# Patient Record
Sex: FEMALE | Race: WHITE | Employment: OTHER | URBAN - METROPOLITAN AREA
[De-identification: names, ages, dates, MRNs, and addresses within clinical notes are randomized per-mention and may not be internally consistent; named-entity substitution may affect disease eponyms.]

---

## 2017-05-01 ENCOUNTER — TELEPHONE (OUTPATIENT)
Dept: OBGYN | Facility: CLINIC | Age: 32
End: 2017-05-01

## 2017-05-01 NOTE — TELEPHONE ENCOUNTER
Pt is past due for f/u pap smear.  Reminder letter was sent 10/24/16.  LMTC and schedule at [facility].  Left this writer's number in case of questions ([phone]).  If no reply and/or appt within 2 weeks (05/15/17) pt will be considered lost to pap tracking f/u.  Chelsea Dinh,    Pap Tracking

## 2017-05-01 NOTE — TELEPHONE ENCOUNTER
Pt promptly returned my call, LM on my VM giving permission to leave a detailed message when calling her back.  Left detailed message for pt with reason for my call.  Again left the [facility] number as well as my own in case of more questions.  Chelsea Dinh,    Pap Tracking

## 2017-07-15 ENCOUNTER — HEALTH MAINTENANCE LETTER (OUTPATIENT)
Age: 32
End: 2017-07-15

## 2018-02-11 ENCOUNTER — NURSE TRIAGE (OUTPATIENT)
Dept: NURSING | Facility: CLINIC | Age: 33
End: 2018-02-11

## 2018-02-11 DIAGNOSIS — Z23 NEED FOR PROPHYLACTIC VACCINATION AND INOCULATION AGAINST INFLUENZA: Primary | ICD-10-CM

## 2018-02-11 RX ORDER — OSELTAMIVIR PHOSPHATE 75 MG/1
75 CAPSULE ORAL DAILY
Qty: 10 CAPSULE | Refills: 0 | Status: SHIPPED | OUTPATIENT
Start: 2018-02-11 | End: 2018-02-21

## 2018-02-11 NOTE — TELEPHONE ENCOUNTER
Patient's 8 year old daughter has been diagnosed with influenza on Friday 2/9. Now the 3 other children in the family have influenza symptoms.   Patient does not have any symptoms currently.     Patient goes to the [facility] for her care. Kaylene Alaniz is on call for this clinic and she was paged via [employer] at 2:44 to discuss Tamiflu for her children.  The on call provider is going to order Tamiflu prophylactic for the Patient also.     Patient advised to call back if further questions or concerns.    Reason for Disposition    [1] Influenza EXPOSURE within last 72 hours (3 days) AND [2] NOT HIGH RISK AND [3] strongly requests antiviral medication    Additional Information    Negative: Patient sounds very sick or weak to the triager    Negative: Fever present > 3 days (72 hours)    Protocols used: INFLUENZA EXPOSURE-ADULT-

## 2018-02-11 NOTE — TELEPHONE ENCOUNTER
Mother called in today and stated that child developed illness yesterday and was brought in where they tested positive for influenza.  Mother does not yet have symptoms.  Mother requested tamiflu.  Prescription will be given, script sent to [facility] pharmacy.

## 2018-07-22 ENCOUNTER — HEALTH MAINTENANCE LETTER (OUTPATIENT)
Age: 33
End: 2018-07-22

## 2019-01-28 ENCOUNTER — OFFICE VISIT (OUTPATIENT)
Dept: FAMILY MEDICINE | Facility: CLINIC | Age: 34
End: 2019-01-28
Payer: COMMERCIAL

## 2019-01-28 ENCOUNTER — ANCILLARY PROCEDURE (OUTPATIENT)
Dept: GENERAL RADIOLOGY | Facility: CLINIC | Age: 34
End: 2019-01-28
Payer: COMMERCIAL

## 2019-01-28 VITALS
RESPIRATION RATE: 16 BRPM | BODY MASS INDEX: 33.9 KG/M2 | TEMPERATURE: 98.1 F | SYSTOLIC BLOOD PRESSURE: 149 MMHG | WEIGHT: 216 LBS | OXYGEN SATURATION: 99 % | DIASTOLIC BLOOD PRESSURE: 80 MMHG | HEART RATE: 72 BPM | HEIGHT: 67 IN

## 2019-01-28 DIAGNOSIS — R10.11 RIGHT UPPER QUADRANT PAIN: Primary | ICD-10-CM

## 2019-01-28 DIAGNOSIS — R10.11 RIGHT UPPER QUADRANT PAIN: ICD-10-CM

## 2019-01-28 LAB
ALBUMIN SERPL-MCNC: 4 G/DL (ref 3.4–5)
ALP SERPL-CCNC: 65 U/L (ref 40–150)
ALT SERPL W P-5'-P-CCNC: 46 U/L (ref 0–50)
ANION GAP SERPL CALCULATED.3IONS-SCNC: 4 MMOL/L (ref 3–14)
AST SERPL W P-5'-P-CCNC: 23 U/L (ref 0–45)
BASOPHILS # BLD AUTO: 0.1 10E9/L (ref 0–0.2)
BASOPHILS NFR BLD AUTO: 0.8 %
BILIRUB SERPL-MCNC: 0.3 MG/DL (ref 0.2–1.3)
BUN SERPL-MCNC: 13 MG/DL (ref 7–30)
CALCIUM SERPL-MCNC: 9.1 MG/DL (ref 8.5–10.1)
CHLORIDE SERPL-SCNC: 102 MMOL/L (ref 94–109)
CO2 SERPL-SCNC: 31 MMOL/L (ref 20–32)
CREAT SERPL-MCNC: 0.8 MG/DL (ref 0.52–1.04)
DIFFERENTIAL METHOD BLD: NORMAL
EOSINOPHIL # BLD AUTO: 0.6 10E9/L (ref 0–0.7)
EOSINOPHIL NFR BLD AUTO: 6.7 %
ERYTHROCYTE [DISTWIDTH] IN BLOOD BY AUTOMATED COUNT: 12 % (ref 10–15)
GFR SERPL CREATININE-BSD FRML MDRD: >90 ML/MIN/{1.73_M2}
GLUCOSE SERPL-MCNC: 89 MG/DL (ref 70–99)
HCT VFR BLD AUTO: 40.1 % (ref 35–47)
HGB BLD-MCNC: 13.7 G/DL (ref 11.7–15.7)
LIPASE SERPL-CCNC: 186 U/L (ref 73–393)
LYMPHOCYTES # BLD AUTO: 2.9 10E9/L (ref 0.8–5.3)
LYMPHOCYTES NFR BLD AUTO: 32.1 %
MCH RBC QN AUTO: 30.1 PG (ref 26.5–33)
MCHC RBC AUTO-ENTMCNC: 34.2 G/DL (ref 31.5–36.5)
MCV RBC AUTO: 88 FL (ref 78–100)
MONOCYTES # BLD AUTO: 0.9 10E9/L (ref 0–1.3)
MONOCYTES NFR BLD AUTO: 10 %
NEUTROPHILS # BLD AUTO: 4.6 10E9/L (ref 1.6–8.3)
NEUTROPHILS NFR BLD AUTO: 50.4 %
PLATELET # BLD AUTO: 226 10E9/L (ref 150–450)
POTASSIUM SERPL-SCNC: 3.7 MMOL/L (ref 3.4–5.3)
PROT SERPL-MCNC: 7.1 G/DL (ref 6.8–8.8)
RBC # BLD AUTO: 4.55 10E12/L (ref 3.8–5.2)
SODIUM SERPL-SCNC: 137 MMOL/L (ref 133–144)
WBC # BLD AUTO: 9.1 10E9/L (ref 4–11)

## 2019-01-28 PROCEDURE — 80053 COMPREHEN METABOLIC PANEL: CPT | Performed by: NURSE PRACTITIONER

## 2019-01-28 PROCEDURE — 83690 ASSAY OF LIPASE: CPT | Performed by: NURSE PRACTITIONER

## 2019-01-28 PROCEDURE — 36415 COLL VENOUS BLD VENIPUNCTURE: CPT | Performed by: NURSE PRACTITIONER

## 2019-01-28 PROCEDURE — 99213 OFFICE O/P EST LOW 20 MIN: CPT | Performed by: NURSE PRACTITIONER

## 2019-01-28 PROCEDURE — 74019 RADEX ABDOMEN 2 VIEWS: CPT

## 2019-01-28 PROCEDURE — 85025 COMPLETE CBC W/AUTO DIFF WBC: CPT | Performed by: NURSE PRACTITIONER

## 2019-01-28 ASSESSMENT — MIFFLIN-ST. JEOR: SCORE: 1707.4

## 2019-01-28 NOTE — PATIENT INSTRUCTIONS
1.  Drink 64 oz of non-caffinated beverages daily.  2.  Google FODMAP diet and try changing diet.  3.  Try Benefiber 2-3 times daily.  4.  Use miralax once or twice daily if symptoms recur as needed.  5.  TODAY:  Get an 8.3 oz bottle of Miralax and mix the entire bottle with Gatorade 64 oz.  Drink one glassful (8 oz) every 15 minutes until gone.  6.  I will call you with lab results tomorrow.  CBC was normal, no infection.

## 2019-01-28 NOTE — LETTER
January 28, 2019      Capri Albert  [id number] [address] [facility] [facility] [address]        Dear ,    We are writing to inform you of your test results.  Please let patient know labs were all normal.      Resulted Orders   CBC with platelets and differential   Result Value Ref Range    WBC 9.1 4.0 - 11.0 10e9/L    RBC Count 4.55 3.8 - 5.2 10e12/L    Hemoglobin 13.7 11.7 - 15.7 g/dL    Hematocrit 40.1 35.0 - 47.0 %    MCV 88 78 - 100 fl    MCH 30.1 26.5 - 33.0 pg    MCHC 34.2 31.5 - 36.5 g/dL    RDW 12.0 10.0 - 15.0 %    Platelet Count 226 150 - 450 10e9/L    % Neutrophils 50.4 %    % Lymphocytes 32.1 %    % Monocytes 10.0 %    % Eosinophils 6.7 %    % Basophils 0.8 %    Absolute Neutrophil 4.6 1.6 - 8.3 10e9/L    Absolute Lymphocytes 2.9 0.8 - 5.3 10e9/L    Absolute Monocytes 0.9 0.0 - 1.3 10e9/L    Absolute Eosinophils 0.6 0.0 - 0.7 10e9/L    Absolute Basophils 0.1 0.0 - 0.2 10e9/L    Diff Method Automated Method    Comprehensive metabolic panel (BMP + Alb, Alk Phos, ALT, AST, Total. Bili, TP)   Result Value Ref Range    Sodium 137 133 - 144 mmol/L    Potassium 3.7 3.4 - 5.3 mmol/L    Chloride 102 94 - 109 mmol/L    Carbon Dioxide 31 20 - 32 mmol/L    Anion Gap 4 3 - 14 mmol/L    Glucose 89 70 - 99 mg/dL      Comment:      Non Fasting    Urea Nitrogen 13 7 - 30 mg/dL    Creatinine 0.80 0.52 - 1.04 mg/dL    GFR Estimate >90 >60 mL/min/[1.73_m2]      Comment:      Non  GFR Calc  Starting 12/18/2018, serum creatinine based estimated GFR (eGFR) will be   calculated using the Chronic Kidney Disease Epidemiology Collaboration   (CKD-EPI) equation.      GFR Estimate If Black >90 >60 mL/min/[1.73_m2]      Comment:       GFR Calc  Starting 12/18/2018, serum creatinine based estimated GFR (eGFR) will be   calculated using the Chronic Kidney Disease Epidemiology Collaboration   (CKD-EPI) equation.      Calcium 9.1 8.5 - 10.1 mg/dL    Bilirubin Total 0.3 0.2 - 1.3 mg/dL    Albumin 4.0 3.4 - 5.0  g/dL    Protein Total 7.1 6.8 - 8.8 g/dL    Alkaline Phosphatase 65 40 - 150 U/L    ALT 46 0 - 50 U/L    AST 23 0 - 45 U/L   Lipase   Result Value Ref Range    Lipase 186 73 - 393 U/L       If you have any questions or concerns, please call the clinic at the number listed above.       Sincerely,        Eileen Coto, NP/cb

## 2019-01-28 NOTE — PROGRESS NOTES
SUBJECTIVE:   Capri Albert is a 33 year old female who presents to clinic today for the following health issues:      ABDOMINAL   PAIN     Onset: 2-3 days    Description: Pt state sthat she woke up a few days ago with right lower back pain. The back pain subsided and moved into her right upper abdomen. She feels the painful area is increasing in size and becoming more painful. She was born with only a right kidney and reports having kidney stones in the past.  Character: Sharp, Dull ache and Burning  Location: right upper quadrant  Radiation: None    Intensity: moderate, 5-8/10    Progression of Symptoms:  worsening and constant    Accompanying Signs & Symptoms:  Fever/Chills?: no   Gas/Bloating: YES- bloating. Pt feels like she needs to have a BM even when she doesn't.  Nausea: no   Vomitting: no   Diarrhea?: no   Constipation:no   Dysuria or Hematuria: no    History:   Trauma: no   Previous similar pain: YES- Pt has had a similar pain with kidney stones in the past. also has had a strained muscle .   Previous tests done: none    Precipitating factors:   Does the pain change with:     Food: YES- eating seems to help; no loss of appetite, not making things worse    BM: no     Urination: no     Alleviating factors:  None    Therapies Tried and outcome: Ibuprofen, tylenol, heat has not helped.    LMP:  1/7/19 (approximately)     Hx of cholecystectomy.  Started out like lower back pain and now anterior right upper quadrant pain.  Stools normal and daily with good amounts.  Bloated feeling but no gas.  Urination is normal and does not correlate with pain.  Patient questions muscle pull but does not remember doing anything this weekend that would cause this.     Problem list and histories reviewed & adjusted, as indicated.  Additional history: as documented    Patient Active Problem List   Diagnosis     Congenital renal agenesis and dysgenesis     Cold sore     Congenital uterine anomaly     S/P laparoscopic  procedure     CARDIOVASCULAR SCREENING; LDL GOAL LESS THAN 160     Encounter for post Essure sterilization check     Cervical high risk HPV (human papillomavirus) test positive     Obesity     Migraine without aura and without status migrainosus, not intractable     Past Surgical History:   Procedure Laterality Date     C/SECTION, LOW TRANSVERSE      , Low Transverse      SECTION  9/10/2013    Procedure:  SECTION;;  Surgeon: Isac Palmer MD;  Location: [facility] L[facility]     FASCIOTOMY LOWER EXTREMITY Right 2016    Procedure: FASCIOTOMY LOWER EXTREMITY;  Surgeon: Joshua Arrieta DPM;  Location: [facility]     LAPAROSCOPIC CHOLECYSTECTOMY  2012    Procedure: LAPAROSCOPIC CHOLECYSTECTOMY;  Laparoscopic Cholecystectomy;  Surgeon: Kvng Borden MD;  Location: [facility]     LAPAROTOMY EXPLORATORY  2012    Procedure: LAPAROTOMY EXPLORATORY;;  Surgeon: Kvng Borden MD;  Location: [facility]     RELEASE PLANTAR FASCIA Left 2016    Procedure: RELEASE PLANTAR FASCIA;  Surgeon: Joshua Arrieta DPM;  Location: [facility]     SURGICAL HISTORY OF -   2004    Right Knee Arthroscopy & Partial Medial and lateral Meniscectomies & Allograft reconstruction of the anterior cruciate ligament tear     SURGICAL HISTORY OF -   2000    Cystoscopy, right retrograde study     SURGICAL HISTORY OF -       Kidney Stone Surgery     SURGICAL HISTORY OF -   2000    Obstructed hemivagina with hematometra and hematocolpos & Complete uterovaginal duplication. & Excision of Left vaginal septum and drainage of hematocolpos     SURGICAL HISTORY OF -       IVP with tomograms that showed absent of left kidney,      SURGICAL HISTORY OF -       Septoplasty       Social History     Tobacco Use     Smoking status: Never Smoker     Smokeless tobacco: Never Used   Substance Use Topics     Alcohol use: Yes     Comment: occas-      Family History   Problem Relation Age of Onset     Hypertension Mother       "Alcohol/Drug Father         recovered alcohol     Cardiovascular Paternal Grandfather 30        MI     Cardiovascular Paternal Grandmother 60        MI     Cardiovascular Maternal Grandfather 60        MI         Current Outpatient Medications   Medication Sig Dispense Refill     Acetaminophen-Caffeine (EXCEDRIN TENSION HEADACHE PO) Take by mouth daily as needed       ibuprofen 200 MG capsule 4 tablets  capsule      No Known Allergies    Reviewed and updated as needed this visit by clinical staff  Tobacco  Allergies  Meds  Problems  Med Hx  Surg Hx  Fam Hx  Soc Hx        Reviewed and updated as needed this visit by Provider  Tobacco  Allergies  Meds  Problems  Med Hx  Surg Hx  Fam Hx         ROS:  CONSTITUTIONAL: NEGATIVE for fever, chills, change in weight  RESP: NEGATIVE for significant cough or SOB  CV: NEGATIVE for chest pain, palpitations or peripheral edema  GI: NEGATIVE for nausea, abdominal pain, heartburn, or change in bowel habits and POSITIVE for abdominal pain RUQ  MUSCULOSKELETAL: POSITIVE  for back pain lower right  PSYCHIATRIC: NEGATIVE for changes in mood or affect  ROS otherwise negative    OBJECTIVE:     /80   Pulse 72   Temp 98.1  F (36.7  C) (Tympanic)   Resp 16   Ht 1.702 m (5' 7\")   Wt 98 kg (216 lb)   SpO2 99%   BMI 33.83 kg/m    Body mass index is 33.83 kg/m .  GENERAL: healthy, alert and no distress  RESP: lungs clear to auscultation - no rales, rhonchi or wheezes  CV: regular rate and rhythm, normal S1 S2, no S3 or S4, no murmur, click or rub, no peripheral edema and peripheral pulses strong  ABDOMEN: tenderness RUQ and mid right side, no organomegaly or masses and bowel sounds normal  MS: no gross musculoskeletal defects noted, no edema  PSYCH: mentation appears normal, affect normal/bright    Diagnostic Test Results:  Results for orders placed or performed in visit on 01/28/19 (from the past 24 hour(s))   CBC with platelets and differential   Result Value " Ref Range    WBC 9.1 4.0 - 11.0 10e9/L    RBC Count 4.55 3.8 - 5.2 10e12/L    Hemoglobin 13.7 11.7 - 15.7 g/dL    Hematocrit 40.1 35.0 - 47.0 %    MCV 88 78 - 100 fl    MCH 30.1 26.5 - 33.0 pg    MCHC 34.2 31.5 - 36.5 g/dL    RDW 12.0 10.0 - 15.0 %    Platelet Count 226 150 - 450 10e9/L    % Neutrophils 50.4 %    % Lymphocytes 32.1 %    % Monocytes 10.0 %    % Eosinophils 6.7 %    % Basophils 0.8 %    Absolute Neutrophil 4.6 1.6 - 8.3 10e9/L    Absolute Lymphocytes 2.9 0.8 - 5.3 10e9/L    Absolute Monocytes 0.9 0.0 - 1.3 10e9/L    Absolute Eosinophils 0.6 0.0 - 0.7 10e9/L    Absolute Basophils 0.1 0.0 - 0.2 10e9/L    Diff Method Automated Method      Abdominal x-ray - Awaiting radiology report.    ASSESSMENT/PLAN:     1. Right upper quadrant pain  Labs ordered.  CBC was normal.  Abdominal XR shows moderate amount of stool especially in the RUQ which may be cause.  Recommended fluids, fiber, and miralax as needed for ongoing treatment.  Today, I have recommended cleanout of the bowel to reduce symptoms quickly.  Follow-up in clinic if any worsening or persistent symptoms.  I will call with the rest of the lab results tomorrow.  - CBC with platelets and differential  - Comprehensive metabolic panel (BMP + Alb, Alk Phos, ALT, AST, Total. Bili, TP)  - Lipase  - XR Abdomen 2 Views; Future    Patient Instructions   1.  Drink 64 oz of non-caffinated beverages daily.  2.  Google FODMAP diet and try changing diet.  3.  Try Benefiber 2-3 times daily.  4.  Use miralax once or twice daily if symptoms recur as needed.  5.  TODAY:  Get an 8.3 oz bottle of Miralax and mix the entire bottle with Gatorade 64 oz.  Drink one glassful (8 oz) every 15 minutes until gone.  6.  I will call you with lab results tomorrow.  CBC was normal, no infection.    Eileen Coto, NATHANAEL  [facility]

## 2019-01-30 ENCOUNTER — ANCILLARY PROCEDURE (OUTPATIENT)
Dept: GENERAL RADIOLOGY | Facility: CLINIC | Age: 34
End: 2019-01-30
Payer: COMMERCIAL

## 2019-01-30 ENCOUNTER — TELEPHONE (OUTPATIENT)
Dept: FAMILY MEDICINE | Facility: CLINIC | Age: 34
End: 2019-01-30

## 2019-01-30 DIAGNOSIS — K59.00 CONSTIPATION, UNSPECIFIED CONSTIPATION TYPE: ICD-10-CM

## 2019-01-30 DIAGNOSIS — K59.00 CONSTIPATION, UNSPECIFIED CONSTIPATION TYPE: Primary | ICD-10-CM

## 2019-01-30 PROCEDURE — 74019 RADEX ABDOMEN 2 VIEWS: CPT

## 2019-01-30 NOTE — TELEPHONE ENCOUNTER
Patient is coming in for X-ray and I will discuss results with her at that time.  Eileen Coto NP on 1/30/2019 at 2:22 PM

## 2019-01-30 NOTE — PROGRESS NOTES
X-ray shows retained stool in right side of the colon despite cleanout.  At this time, will continue fluids, fiber, miralax 1 capful once or twice daily over the next week to see if symptoms subside and stool passes through.  Continues to deny urinary symptoms and fever at this time.  Did suggest Beano for bloating and gas along with FODMAP diet.  Recommend f/u in 1 week if any persistent symptoms.  Eileen Coto NP on 1/30/2019 at 3:13 PM

## 2019-01-30 NOTE — PROGRESS NOTES
Called patient and she has completed prep and feels the same symptoms.  Ordered repeat x-ray today. She will come in for this and I will discuss it with her after completed.  Eileen Coto NP on 1/30/2019 at 2:21 PM

## 2019-01-30 NOTE — TELEPHONE ENCOUNTER
Reason for call:  Patient reporting a symptom    Symptom or request: pt was seen on 1/28 for this and was given bowel regiment and pt is calling to report this did not help pt is still having pain    Duration (how long have symptoms been present):     Have you been treated for this before? Yes    Additional comments: bereket farnsworth on 1/28    Phone Number patient can be reached at:  Home number on file [phone] (home)    Best Time:  any    Can we leave a detailed message on this number:  YES    Call taken on 1/30/2019 at 1:47 PM by Rowena Quarles

## 2019-01-30 NOTE — TELEPHONE ENCOUNTER
Patient seen 1-28-19 for RUQ pain.  She did bowel regimen as recommended by provider (OV notes below) with no results.  Pain persists.  Any further recommendations?  Return for OV?    Right upper quadrant pain  Labs ordered.  CBC was normal.  Abdominal XR shows moderate amount of stool especially in the RUQ which may be cause.  Recommended fluids, fiber, and miralax as needed for ongoing treatment.  Today, I have recommended cleanout of the bowel to reduce symptoms quickly.  Follow-up in clinic if any worsening or persistent symptoms.  I will call with the rest of the lab results tomorrow.  - CBC with platelets and differential  - Comprehensive metabolic panel (BMP + Alb, Alk Phos, ALT, AST, Total. Bili, TP)  - Lipase  - XR Abdomen 2 Views; Future     Patient Instructions   1.  Drink 64 oz of non-caffinated beverages daily.  2.  Google FODMAP diet and try changing diet.  3.  Try Benefiber 2-3 times daily.  4.  Use miralax once or twice daily if symptoms recur as needed.  5.  TODAY:  Get an 8.3 oz bottle of Miralax and mix the entire bottle with Gatorade 64 oz.  Drink one glassful (8 oz) every 15 minutes until gone.  6.  I will call you with lab results tomorrow.  CBC was normal, no infection.     Eileen Coto NP  [facility]        Routing to provider.  Prema PACK RN

## 2019-04-09 ENCOUNTER — NURSE TRIAGE (OUTPATIENT)
Dept: NURSING | Facility: CLINIC | Age: 34
End: 2019-04-09

## 2019-04-09 NOTE — TELEPHONE ENCOUNTER
Has been urinating blood for the last 2 days. No other UTI symptoms. No fever. Has been exercising hard the last few days. Heading to [facility].    Marianne Ritter RN/ [facility]        Reason for Disposition    Blood in urine, but all triage questions negative  (Exception: could be normal menstrual bleeding)    Additional Information    Negative: Shock suspected (e.g., cold/pale/clammy skin, too weak to stand, low BP, rapid pulse)    Negative: Sounds like a life-threatening emergency to the triager    Negative: Recent back or abdominal injury    Negative: Recent genital injury    Negative: [1] Unable to urinate (or only a few drops) > 4 hours AND [2] bladder feels very full (e.g., palpable bladder or strong urge to urinate)    Negative: Passing pure blood or large blood clots (i.e., size > a dime) (Exception: zeny or small strands)    Negative: Fever > 100.5 F (38.1 C)    Negative: Patient sounds very sick or weak to the triager    Negative: Known sickle cell disease    Negative: Taking Coumadin (warfarin) or other strong blood thinner, or known bleeding disorder (e.g., thrombocytopenia)    Negative: [1] Pink or red-colored urine and likely from food (beets, rhubarb, red food dye) AND [2] lasts > 24 hours after stopping food    Negative: Pain or burning with passing urine    Negative: Side (flank) or back pain present    Protocols used: URINE - BLOOD IN-ADULT-

## 2019-04-18 ENCOUNTER — OFFICE VISIT (OUTPATIENT)
Dept: FAMILY MEDICINE | Facility: CLINIC | Age: 34
End: 2019-04-18
Payer: COMMERCIAL

## 2019-04-18 ENCOUNTER — HOSPITAL ENCOUNTER (OUTPATIENT)
Dept: CT IMAGING | Facility: CLINIC | Age: 34
Discharge: HOME | End: 2019-04-18
Attending: FAMILY MEDICINE | Admitting: FAMILY MEDICINE
Payer: COMMERCIAL

## 2019-04-18 VITALS
BODY MASS INDEX: 32.49 KG/M2 | TEMPERATURE: 97.9 F | RESPIRATION RATE: 14 BRPM | DIASTOLIC BLOOD PRESSURE: 86 MMHG | SYSTOLIC BLOOD PRESSURE: 116 MMHG | HEIGHT: 67 IN | WEIGHT: 207 LBS | OXYGEN SATURATION: 98 % | HEART RATE: 85 BPM

## 2019-04-18 DIAGNOSIS — R31.0 GROSS HEMATURIA: ICD-10-CM

## 2019-04-18 DIAGNOSIS — Q60.2 CONGENITAL RENAL AGENESIS AND DYSGENESIS: ICD-10-CM

## 2019-04-18 DIAGNOSIS — Z87.442 HISTORY OF KIDNEY STONES: ICD-10-CM

## 2019-04-18 DIAGNOSIS — R31.0 GROSS HEMATURIA: Primary | ICD-10-CM

## 2019-04-18 DIAGNOSIS — Q60.5 CONGENITAL RENAL AGENESIS AND DYSGENESIS: ICD-10-CM

## 2019-04-18 LAB
ALBUMIN UR-MCNC: NEGATIVE MG/DL
APPEARANCE UR: ABNORMAL
BILIRUB UR QL STRIP: NEGATIVE
COLOR UR AUTO: YELLOW
GLUCOSE UR STRIP-MCNC: NEGATIVE MG/DL
HGB UR QL STRIP: ABNORMAL
KETONES UR STRIP-MCNC: NEGATIVE MG/DL
LEUKOCYTE ESTERASE UR QL STRIP: NEGATIVE
NITRATE UR QL: NEGATIVE
NON-SQ EPI CELLS #/AREA URNS LPF: ABNORMAL /LPF
PH UR STRIP: 6.5 PH (ref 5–7)
RBC #/AREA URNS AUTO: ABNORMAL /HPF
SOURCE: ABNORMAL
SP GR UR STRIP: 1.01 (ref 1–1.03)
URATE CRY #/AREA URNS HPF: ABNORMAL /HPF
UROBILINOGEN UR STRIP-ACNC: 0.2 EU/DL (ref 0.2–1)
WBC #/AREA URNS AUTO: ABNORMAL /HPF

## 2019-04-18 PROCEDURE — 81001 URINALYSIS AUTO W/SCOPE: CPT | Performed by: FAMILY MEDICINE

## 2019-04-18 PROCEDURE — 99214 OFFICE O/P EST MOD 30 MIN: CPT | Performed by: FAMILY MEDICINE

## 2019-04-18 PROCEDURE — 74176 CT ABD & PELVIS W/O CONTRAST: CPT

## 2019-04-18 ASSESSMENT — MIFFLIN-ST. JEOR: SCORE: 1666.58

## 2019-04-18 NOTE — PROGRESS NOTES
SUBJECTIVE:   Capri Albert is a 33 year old female who presents to clinic today for the following   health issues:  Chief Complaint   Patient presents with     UTI     Pt here for blood in urine.         URINARY TRACT SYMPTOMS  Onset: 1 wk ago    Description:   Painful urination (Dysuria): no   Blood in urine (Hematuria): YES  Delay in urine (Hesitency): no     Intensity: no discomfort    Progression of Symptoms:  same and intermittent    Accompanying Signs & Symptoms:  Fever/chills: no   Flank pain no   Nausea and vomiting: no   Any vaginal symptoms: none  Abdominal/Pelvic Pain: no     History:   History of frequent UTI's: YES - it's been a while since last per patient.  History of kidney stones: YES- in high school, nothing after that  Sexually Active: YES  Possibility of pregnancy: No  Patient has congenital renal agenesis.    Precipitating factors:   none    Therapies Tried and outcome: Increase fluid intake, has not noticed any difference    Verified above history with patient.      Additional history: as documented    Reviewed  and updated as needed this visit by clinical staff  Tobacco  Allergies  Meds  Problems  Med Hx  Surg Hx  Fam Hx  Soc Hx          Reviewed and updated as needed this visit by Provider  Tobacco  Allergies  Meds  Problems  Med Hx  Surg Hx  Fam Hx         Patient Active Problem List   Diagnosis     Congenital renal agenesis and dysgenesis     Cold sore     Congenital uterine anomaly     S/P laparoscopic procedure     CARDIOVASCULAR SCREENING; LDL GOAL LESS THAN 160     Encounter for post Essure sterilization check     Cervical high risk HPV (human papillomavirus) test positive     Obesity     Migraine without aura and without status migrainosus, not intractable     Past Surgical History:   Procedure Laterality Date     C/SECTION, LOW TRANSVERSE      , Low Transverse      SECTION  9/10/2013    Procedure:  SECTION;;  Surgeon: Isac Palmer  MD Tenzin;  [facility]: [facility]     FASCIOTOMY LOWER EXTREMITY Right 12/6/2016    Procedure: FASCIOTOMY LOWER EXTREMITY;  Surgeon: Joshua Arrieta DPM;  Location: [facility]     LAPAROSCOPIC CHOLECYSTECTOMY  7/24/2012    Procedure: LAPAROSCOPIC CHOLECYSTECTOMY;  Laparoscopic Cholecystectomy;  Surgeon: Kvng Borden MD;  Location: [facility]     LAPAROTOMY EXPLORATORY  7/24/2012    Procedure: LAPAROTOMY EXPLORATORY;;  Surgeon: Kvng Borden MD;  Location: [facility]     RELEASE PLANTAR FASCIA Left 9/20/2016    Procedure: RELEASE PLANTAR FASCIA;  Surgeon: Joshua Arrieta DPM;  Location: [facility]     SURGICAL HISTORY OF -   8/19/2004    Right Knee Arthroscopy & Partial Medial and lateral Meniscectomies & Allograft reconstruction of the anterior cruciate ligament tear     SURGICAL HISTORY OF -   11/8/2000    Cystoscopy, right retrograde study     SURGICAL HISTORY OF -   2000    Kidney Stone Surgery     SURGICAL HISTORY OF -   7/2000    Obstructed hemivagina with hematometra and hematocolpos & Complete uterovaginal duplication. & Excision of Left vaginal septum and drainage of hematocolpos     SURGICAL HISTORY OF -       IVP with tomograms that showed absent of left kidney,      SURGICAL HISTORY OF -       Septoplasty       Social History     Tobacco Use     Smoking status: Never Smoker     Smokeless tobacco: Never Used   Substance Use Topics     Alcohol use: Yes     Comment: occas-      Family History   Problem Relation Age of Onset     Hypertension Mother      Alcohol/Drug Father         recovered alcohol     Cardiovascular Paternal Grandfather 30        MI     Cardiovascular Paternal Grandmother 60        MI     Cardiovascular Maternal Grandfather 60        MI         Current Outpatient Medications   Medication Sig Dispense Refill     Acetaminophen-Caffeine (EXCEDRIN TENSION HEADACHE PO) Take by mouth daily as needed       ibuprofen 200 MG capsule 4 tablets  capsule      No Known Allergies    ROS:  C: NEGATIVE for  "fever, chills, change in weight  I: NEGATIVE for worrisome rashes, moles or lesions  R: NEGATIVE for significant cough or SOB  CV: NEGATIVE for chest pain, palpitations or peripheral edema  GI: NEGATIVE for nausea, abdominal pain, heartburn, or change in bowel habits  :see above  H: NEGATIVE for bleeding problems    OBJECTIVE:                                                    /86   Pulse 85   Temp 97.9  F (36.6  C) (Tympanic)   Resp 14   Ht 1.702 m (5' 7\")   Wt 93.9 kg (207 lb)   LMP 03/26/2019 (Approximate)   SpO2 98%   BMI 32.42 kg/m    Body mass index is 32.42 kg/m .  GENERAL: obese,  alert and no distress, ambulatory w/o assist  SKIN: no suspicious lesions, no rashes  BACK: no CVA tenderness either side  ABD:  Rounded, soft, no tenderness, no guarding, no mass palpable on exam    Diagnostic test results:  Diagnostic Test Results:  Results for orders placed or performed in visit on 04/18/19 (from the past 24 hour(s))   UA reflex to Microscopic and Culture   Result Value Ref Range    Color Urine Yellow     Appearance Urine Cloudy     Glucose Urine Negative NEG^Negative mg/dL    Bilirubin Urine Negative NEG^Negative    Ketones Urine Negative NEG^Negative mg/dL    Specific Gravity Urine 1.015 1.003 - 1.035    Blood Urine Large (A) NEG^Negative    pH Urine 6.5 5.0 - 7.0 pH    Protein Albumin Urine Negative NEG^Negative mg/dL    Urobilinogen Urine 0.2 0.2 - 1.0 EU/dL    Nitrite Urine Negative NEG^Negative    Leukocyte Esterase Urine Negative NEG^Negative    Source Midstream Urine    Urine Microscopic   Result Value Ref Range    WBC Urine 0 - 5 OTO5^0 - 5 /HPF    RBC Urine 10-25 (A) OTO2^O - 2 /HPF    Squamous Epithelial /LPF Urine Few FEW^Few /LPF    Uric Acid Crystals Few (A) NEG^Negative /HPF        ASSESSMENT/PLAN:                                                        ICD-10-CM    1. Gross hematuria R31.0 UA reflex to Microscopic and Culture     Urine Microscopic     CT Abdomen Pelvis w/o Contrast "   2. History of kidney stones Z87.442 CT Abdomen Pelvis w/o Contrast   3. Congenital renal agenesis and dysgenesis Q60.2 CT Abdomen Pelvis w/o Contrast    Q60.5      Patient has asymptomatic hematuria. No history of trauma. Not currently on her menstrual period.  Discussed with patient possible causes: cystitis, urinary calculus, occult renal pathology, bladder mass  Advised ua did not show sign of infection but did show blood. Urate crystals also found.  With her hx of urinary stones, suspect non-obstructing calculus.  With her history of renal agenesis, if she does have a non-passable stone, she may be at risk of obstructve uropathy/nephropathy that could put her solitary kidney at risk.  Discussed imaging options with her and she concurred with CT abdomen/pelvis with no contrast to r/o stone or other pathology for the hematuria.  Push oral fluids as tolerated.  Return precautions discussed and given to patient.      Follow up with Provider - depending on CT result   Patient Instructions   To schedule the CT scan , call [phone].    Results to be called to you 1-2 business days after that is done.      Patient Education     Blood in the Urine    Blood in the urine (hematuria) has many possible causes. If it occurs after an injury (such as a car accident or fall), it is most often a sign of bruising to the kidney or bladder. Common causes of blood in the urine include urinary tract infections, kidney stones, inflammation, tumors, or certain other diseases of the kidney or bladder. Menstruation can cause blood to appear in the urine sample, although it is not coming from the urinary tract.  If only a trace amount of blood is present, it will show up on the urine test, even though the urine may be yellow and not pink or red. This may occur with any of the above conditions, as well as heavy exercise or high fever. In this case, your doctor may want to repeat the urine test on another day. This will show if the  blood is still present. If it is, then other tests can be done to find out the cause.  Home care  Follow these home care guidelines:    If your urine does not appear bloody (pink, brown or red) then you do not need to restrict your activity in any way.    If you can see blood in your urine, rest and avoid heavy exertion until your next exam. Do not use aspirin, blood thinners, or anti-platelet or anti-inflammatory medicines. These include ibuprofen and naproxen. These thin the blood and may increase bleeding.  Follow-up care  Follow up with your healthcare provider, or as advised. If you were injured and had blood in your urine, you should have a repeat urine test in 1 to 2 days. Contact your doctor for this test.  A radiologist will review any X-rays that were taken. You will be told of any new findings that may affect your care.  When to seek medical advice  Call your healthcare provider right away if any of these occur:    Bright red blood or blood clots in the urine (if you did not have this before)    Weakness, dizziness or fainting    New groin, abdominal, or back pain    Fever of 100.4 F (38 C) or higher, or as directed by your healthcare provider    Repeated vomiting    Bleeding from the nose or gums or easy bruising  Date Last Reviewed: 9/1/2016 2000-2018 [employer]. [address] [place] [address], [place] [address]. All rights reserved. This information is not intended as a substitute for professional medical care. Always follow your healthcare professional's instructions.               Karl Rivas MD  [facility] [place]

## 2019-04-18 NOTE — PATIENT INSTRUCTIONS
To schedule the CT scan , call [phone].    Results to be called to you 1-2 business days after that is done.      Patient Education     Blood in the Urine    Blood in the urine (hematuria) has many possible causes. If it occurs after an injury (such as a car accident or fall), it is most often a sign of bruising to the kidney or bladder. Common causes of blood in the urine include urinary tract infections, kidney stones, inflammation, tumors, or certain other diseases of the kidney or bladder. Menstruation can cause blood to appear in the urine sample, although it is not coming from the urinary tract.  If only a trace amount of blood is present, it will show up on the urine test, even though the urine may be yellow and not pink or red. This may occur with any of the above conditions, as well as heavy exercise or high fever. In this case, your doctor may want to repeat the urine test on another day. This will show if the blood is still present. If it is, then other tests can be done to find out the cause.  Home care  Follow these home care guidelines:    If your urine does not appear bloody (pink, brown or red) then you do not need to restrict your activity in any way.    If you can see blood in your urine, rest and avoid heavy exertion until your next exam. Do not use aspirin, blood thinners, or anti-platelet or anti-inflammatory medicines. These include ibuprofen and naproxen. These thin the blood and may increase bleeding.  Follow-up care  Follow up with your healthcare provider, or as advised. If you were injured and had blood in your urine, you should have a repeat urine test in 1 to 2 days. Contact your doctor for this test.  A radiologist will review any X-rays that were taken. You will be told of any new findings that may affect your care.  When to seek medical advice  Call your healthcare provider right away if any of these occur:    Bright red blood or blood clots in the urine (if you did not have this  before)    Weakness, dizziness or fainting    New groin, abdominal, or back pain    Fever of 100.4 F (38 C) or higher, or as directed by your healthcare provider    Repeated vomiting    Bleeding from the nose or gums or easy bruising  Date Last Reviewed: 9/1/2016 2000-2018 [employer]. [address] [place] [id number]. All rights reserved. This information is not intended as a substitute for professional medical care. Always follow your healthcare professional's instructions.

## 2019-04-24 ENCOUNTER — TELEPHONE (OUTPATIENT)
Dept: UROLOGY | Facility: CLINIC | Age: 34
End: 2019-04-24

## 2019-04-24 ENCOUNTER — TELEPHONE (OUTPATIENT)
Dept: FAMILY MEDICINE | Facility: CLINIC | Age: 34
End: 2019-04-24

## 2019-04-24 NOTE — TELEPHONE ENCOUNTER
"S:  Patient calling for CT results    B:  Patient had CT scan of bladder/ureters on 4/18/19  Results were sent to patient via [employer].  Patient states she does not check MyChart and did not see results.  Patient requests phone calls in the future.    Writer read following note from 4/18/19 CT scan to patient:  \"Jasbir Farooq.     Your CT scan showed 5 mm stones in the junction of the bladder and ureter on right, and on the right kidney collecting area. There is no sign to suggest obstruction. No other acute abnormality found.     This is likely the source of bleeding.   Push oral fluids.   If you are willing to, we can plan to strain urine in the next few days and see if you can pass the stone, collect it and turn in for analysis.   Let the care team know if you would like to pursue this.     If the stones do not pass, if you have recurrent bloody urine, or if you develop pain, referral to urology is recommended.     If you develop fever, severe pain or difficulty urinating, you should be brought to the ER.     Please contact the clinic if you have any additional questions or concerns.  Have a great day!     Yours truly,   Dr. Rivas \"    A:  Patient denies fever.  Patient states that she is still urinating.  Patient reports that she has weird pain on right side.  Patient reports continued blood with urine passing.  Patient is requesting a referral to urology.    R:  ED precautions r/t fever and/or urinary obstruction discussed.  Writer pended referral to urology.  Routed to provider:  Please review, complete and sign referral for urology if appropriate.    Dutch Weiner RN    "

## 2019-04-24 NOTE — TELEPHONE ENCOUNTER
Reason for Call:  Request for results:    Name of test or procedure: CT    Date of test of procedure: 4/18    Location of the test or procedure: [place]    OK to leave the result message on voice mail or with a family member? YES    Phone number Patient can be reached at:  Home number on file [phone] (home)    Additional comments: pt calling stating she had CT done on 4/18 has kidney stones. Would like to get results she is in a lot of pain and would like to get on some meds. Her PCP told her they were sending results to urology     Call taken on 4/24/2019 at 2:57 PM by Melody Joy

## 2019-04-25 ENCOUNTER — TELEPHONE (OUTPATIENT)
Dept: UROLOGY | Facility: CLINIC | Age: 34
End: 2019-04-25

## 2019-04-25 ENCOUNTER — ANESTHESIA EVENT (OUTPATIENT)
Dept: SURGERY | Facility: CLINIC | Age: 34
End: 2019-04-25
Payer: COMMERCIAL

## 2019-04-25 ENCOUNTER — HOSPITAL ENCOUNTER (OUTPATIENT)
Facility: CLINIC | Age: 34
Discharge: HOME | End: 2019-04-25
Attending: UROLOGY | Admitting: UROLOGY
Payer: COMMERCIAL

## 2019-04-25 ENCOUNTER — APPOINTMENT (OUTPATIENT)
Dept: GENERAL RADIOLOGY | Facility: CLINIC | Age: 34
End: 2019-04-25
Attending: UROLOGY
Payer: COMMERCIAL

## 2019-04-25 ENCOUNTER — ANESTHESIA (OUTPATIENT)
Dept: SURGERY | Facility: CLINIC | Age: 34
End: 2019-04-25
Payer: COMMERCIAL

## 2019-04-25 VITALS
TEMPERATURE: 97.5 F | WEIGHT: 206.13 LBS | RESPIRATION RATE: 12 BRPM | BODY MASS INDEX: 32.35 KG/M2 | OXYGEN SATURATION: 98 % | HEART RATE: 60 BPM | HEIGHT: 67 IN | SYSTOLIC BLOOD PRESSURE: 126 MMHG | DIASTOLIC BLOOD PRESSURE: 84 MMHG

## 2019-04-25 DIAGNOSIS — N20.0 NEPHROLITHIASIS: Primary | ICD-10-CM

## 2019-04-25 DIAGNOSIS — N20.1 URETERAL STONE: Primary | ICD-10-CM

## 2019-04-25 LAB
ANION GAP SERPL CALCULATED.3IONS-SCNC: 8 MMOL/L (ref 3–14)
BUN SERPL-MCNC: 11 MG/DL (ref 7–30)
CALCIUM SERPL-MCNC: 8.4 MG/DL (ref 8.5–10.1)
CHLORIDE SERPL-SCNC: 106 MMOL/L (ref 94–109)
CO2 SERPL-SCNC: 27 MMOL/L (ref 20–32)
CREAT SERPL-MCNC: 0.72 MG/DL (ref 0.52–1.04)
ERYTHROCYTE [DISTWIDTH] IN BLOOD BY AUTOMATED COUNT: 12.4 % (ref 10–15)
GFR SERPL CREATININE-BSD FRML MDRD: >90 ML/MIN/{1.73_M2}
GLUCOSE BLDC GLUCOMTR-MCNC: 88 MG/DL (ref 70–99)
GLUCOSE SERPL-MCNC: 91 MG/DL (ref 70–99)
HCG UR QL: NEGATIVE
HCT VFR BLD AUTO: 39 % (ref 35–47)
HGB BLD-MCNC: 13.2 G/DL (ref 11.7–15.7)
MCH RBC QN AUTO: 30.1 PG (ref 26.5–33)
MCHC RBC AUTO-ENTMCNC: 33.8 G/DL (ref 31.5–36.5)
MCV RBC AUTO: 89 FL (ref 78–100)
PLATELET # BLD AUTO: 222 10E9/L (ref 150–450)
POTASSIUM SERPL-SCNC: 3.6 MMOL/L (ref 3.4–5.3)
RBC # BLD AUTO: 4.39 10E12/L (ref 3.8–5.2)
SODIUM SERPL-SCNC: 141 MMOL/L (ref 133–144)
WBC # BLD AUTO: 7.8 10E9/L (ref 4–11)

## 2019-04-25 PROCEDURE — 80048 BASIC METABOLIC PNL TOTAL CA: CPT | Performed by: UROLOGY

## 2019-04-25 PROCEDURE — 40000278 XR SURGERY CARM FLUORO LESS THAN 5 MIN: Mod: TC

## 2019-04-25 PROCEDURE — 25000125 ZZHC RX 250: Performed by: NURSE ANESTHETIST, CERTIFIED REGISTERED

## 2019-04-25 PROCEDURE — 25000128 H RX IP 250 OP 636: Performed by: NURSE ANESTHETIST, CERTIFIED REGISTERED

## 2019-04-25 PROCEDURE — 36000059 ZZH SURGERY LEVEL 3 EA 15 ADDTL MIN UMMC: Performed by: UROLOGY

## 2019-04-25 PROCEDURE — 36415 COLL VENOUS BLD VENIPUNCTURE: CPT | Performed by: UROLOGY

## 2019-04-25 PROCEDURE — 25000132 ZZH RX MED GY IP 250 OP 250 PS 637: Performed by: UROLOGY

## 2019-04-25 PROCEDURE — C2617 STENT, NON-COR, TEM W/O DEL: HCPCS | Performed by: UROLOGY

## 2019-04-25 PROCEDURE — 36000061 ZZH SURGERY LEVEL 3 W FLUORO 1ST 30 MIN - UMMC: Performed by: UROLOGY

## 2019-04-25 PROCEDURE — 85027 COMPLETE CBC AUTOMATED: CPT | Performed by: UROLOGY

## 2019-04-25 PROCEDURE — 25500064 ZZH RX 255 OP 636: Performed by: UROLOGY

## 2019-04-25 PROCEDURE — 71000027 ZZH RECOVERY PHASE 2 EACH 15 MINS: Performed by: UROLOGY

## 2019-04-25 PROCEDURE — 25800025 ZZH RX 258: Performed by: UROLOGY

## 2019-04-25 PROCEDURE — 25000566 ZZH SEVOFLURANE, EA 15 MIN: Performed by: UROLOGY

## 2019-04-25 PROCEDURE — 37000008 ZZH ANESTHESIA TECHNICAL FEE, 1ST 30 MIN: Performed by: UROLOGY

## 2019-04-25 PROCEDURE — 82962 GLUCOSE BLOOD TEST: CPT

## 2019-04-25 PROCEDURE — 81025 URINE PREGNANCY TEST: CPT | Performed by: ANESTHESIOLOGY

## 2019-04-25 PROCEDURE — 25000128 H RX IP 250 OP 636: Performed by: UROLOGY

## 2019-04-25 PROCEDURE — 40000170 ZZH STATISTIC PRE-PROCEDURE ASSESSMENT II: Performed by: UROLOGY

## 2019-04-25 PROCEDURE — 71000014 ZZH RECOVERY PHASE 1 LEVEL 2 FIRST HR: Performed by: UROLOGY

## 2019-04-25 PROCEDURE — 25800030 ZZH RX IP 258 OP 636: Performed by: ANESTHESIOLOGY

## 2019-04-25 PROCEDURE — 25000132 ZZH RX MED GY IP 250 OP 250 PS 637: Performed by: ANESTHESIOLOGY

## 2019-04-25 PROCEDURE — 27210794 ZZH OR GENERAL SUPPLY STERILE: Performed by: UROLOGY

## 2019-04-25 PROCEDURE — 82365 CALCULUS SPECTROSCOPY: CPT | Performed by: UROLOGY

## 2019-04-25 PROCEDURE — C1769 GUIDE WIRE: HCPCS | Performed by: UROLOGY

## 2019-04-25 PROCEDURE — 37000009 ZZH ANESTHESIA TECHNICAL FEE, EACH ADDTL 15 MIN: Performed by: UROLOGY

## 2019-04-25 PROCEDURE — C1758 CATHETER, URETERAL: HCPCS | Performed by: UROLOGY

## 2019-04-25 DEVICE — STENT URETERAL PERCUFLEX PLUS 7FRX26CM M0061752730: Type: IMPLANTABLE DEVICE | Site: URETER | Status: FUNCTIONAL

## 2019-04-25 RX ORDER — ACETAMINOPHEN 325 MG/1
650 TABLET ORAL EVERY 4 HOURS PRN
Status: DISCONTINUED | OUTPATIENT
Start: 2019-04-25 | End: 2019-04-25 | Stop reason: HOSPADM

## 2019-04-25 RX ORDER — PROPOFOL 10 MG/ML
INJECTION, EMULSION INTRAVENOUS PRN
Status: DISCONTINUED | OUTPATIENT
Start: 2019-04-25 | End: 2019-04-25

## 2019-04-25 RX ORDER — CEFAZOLIN SODIUM 1 G/3ML
1 INJECTION, POWDER, FOR SOLUTION INTRAMUSCULAR; INTRAVENOUS EVERY 8 HOURS
Status: DISCONTINUED | OUTPATIENT
Start: 2019-04-25 | End: 2019-04-25 | Stop reason: HOSPADM

## 2019-04-25 RX ORDER — DEXAMETHASONE SODIUM PHOSPHATE 4 MG/ML
INJECTION, SOLUTION INTRA-ARTICULAR; INTRALESIONAL; INTRAMUSCULAR; INTRAVENOUS; SOFT TISSUE PRN
Status: DISCONTINUED | OUTPATIENT
Start: 2019-04-25 | End: 2019-04-25

## 2019-04-25 RX ORDER — FENTANYL CITRATE 50 UG/ML
25-50 INJECTION, SOLUTION INTRAMUSCULAR; INTRAVENOUS
Status: DISCONTINUED | OUTPATIENT
Start: 2019-04-25 | End: 2019-04-25 | Stop reason: HOSPADM

## 2019-04-25 RX ORDER — LIDOCAINE HYDROCHLORIDE 20 MG/ML
INJECTION, SOLUTION INFILTRATION; PERINEURAL PRN
Status: DISCONTINUED | OUTPATIENT
Start: 2019-04-25 | End: 2019-04-25

## 2019-04-25 RX ORDER — IBUPROFEN 600 MG/1
600 TABLET, FILM COATED ORAL ONCE
Status: DISCONTINUED | OUTPATIENT
Start: 2019-04-25 | End: 2019-04-25 | Stop reason: HOSPADM

## 2019-04-25 RX ORDER — ONDANSETRON 2 MG/ML
INJECTION INTRAMUSCULAR; INTRAVENOUS PRN
Status: DISCONTINUED | OUTPATIENT
Start: 2019-04-25 | End: 2019-04-25

## 2019-04-25 RX ORDER — SODIUM CHLORIDE, SODIUM LACTATE, POTASSIUM CHLORIDE, CALCIUM CHLORIDE 600; 310; 30; 20 MG/100ML; MG/100ML; MG/100ML; MG/100ML
INJECTION, SOLUTION INTRAVENOUS CONTINUOUS
Status: DISCONTINUED | OUTPATIENT
Start: 2019-04-25 | End: 2019-04-25 | Stop reason: HOSPADM

## 2019-04-25 RX ORDER — LIDOCAINE 40 MG/G
CREAM TOPICAL
Status: DISCONTINUED | OUTPATIENT
Start: 2019-04-25 | End: 2019-04-25 | Stop reason: HOSPADM

## 2019-04-25 RX ORDER — OXYCODONE HYDROCHLORIDE 5 MG/1
5 TABLET ORAL EVERY 4 HOURS PRN
Status: DISCONTINUED | OUTPATIENT
Start: 2019-04-25 | End: 2019-04-25 | Stop reason: HOSPADM

## 2019-04-25 RX ORDER — PROPOFOL 10 MG/ML
INJECTION, EMULSION INTRAVENOUS CONTINUOUS PRN
Status: DISCONTINUED | OUTPATIENT
Start: 2019-04-25 | End: 2019-04-25

## 2019-04-25 RX ORDER — ONDANSETRON 2 MG/ML
4 INJECTION INTRAMUSCULAR; INTRAVENOUS EVERY 30 MIN PRN
Status: DISCONTINUED | OUTPATIENT
Start: 2019-04-25 | End: 2019-04-25 | Stop reason: HOSPADM

## 2019-04-25 RX ORDER — ONDANSETRON 4 MG/1
4 TABLET, ORALLY DISINTEGRATING ORAL EVERY 30 MIN PRN
Status: DISCONTINUED | OUTPATIENT
Start: 2019-04-25 | End: 2019-04-25 | Stop reason: HOSPADM

## 2019-04-25 RX ORDER — CEFAZOLIN SODIUM 2 G/100ML
2 INJECTION, SOLUTION INTRAVENOUS
Status: COMPLETED | OUTPATIENT
Start: 2019-04-25 | End: 2019-04-25

## 2019-04-25 RX ORDER — AMOXICILLIN 250 MG
1-2 CAPSULE ORAL 2 TIMES DAILY
Qty: 30 TABLET | Refills: 0 | Status: SHIPPED | OUTPATIENT
Start: 2019-04-25 | End: 2020-10-02

## 2019-04-25 RX ORDER — HYDROMORPHONE HYDROCHLORIDE 1 MG/ML
.3-.5 INJECTION, SOLUTION INTRAMUSCULAR; INTRAVENOUS; SUBCUTANEOUS EVERY 5 MIN PRN
Status: DISCONTINUED | OUTPATIENT
Start: 2019-04-25 | End: 2019-04-25 | Stop reason: HOSPADM

## 2019-04-25 RX ORDER — CEFAZOLIN SODIUM 1 G/3ML
1 INJECTION, POWDER, FOR SOLUTION INTRAMUSCULAR; INTRAVENOUS SEE ADMIN INSTRUCTIONS
Status: DISCONTINUED | OUTPATIENT
Start: 2019-04-25 | End: 2019-04-25 | Stop reason: HOSPADM

## 2019-04-25 RX ORDER — TAMSULOSIN HYDROCHLORIDE 0.4 MG/1
0.4 CAPSULE ORAL DAILY
Qty: 30 CAPSULE | Refills: 0 | Status: SHIPPED | OUTPATIENT
Start: 2019-04-25 | End: 2020-10-02

## 2019-04-25 RX ORDER — TOLTERODINE 4 MG/1
4 CAPSULE, EXTENDED RELEASE ORAL DAILY
Qty: 30 CAPSULE | Refills: 0 | Status: SHIPPED | OUTPATIENT
Start: 2019-04-25 | End: 2020-10-02

## 2019-04-25 RX ORDER — FENTANYL CITRATE 50 UG/ML
INJECTION, SOLUTION INTRAMUSCULAR; INTRAVENOUS PRN
Status: DISCONTINUED | OUTPATIENT
Start: 2019-04-25 | End: 2019-04-25

## 2019-04-25 RX ORDER — TOLTERODINE 4 MG/1
4 CAPSULE, EXTENDED RELEASE ORAL ONCE
Status: COMPLETED | OUTPATIENT
Start: 2019-04-25 | End: 2019-04-25

## 2019-04-25 RX ORDER — NALOXONE HYDROCHLORIDE 0.4 MG/ML
.1-.4 INJECTION, SOLUTION INTRAMUSCULAR; INTRAVENOUS; SUBCUTANEOUS
Status: DISCONTINUED | OUTPATIENT
Start: 2019-04-25 | End: 2019-04-25 | Stop reason: HOSPADM

## 2019-04-25 RX ORDER — HYDROCODONE BITARTRATE AND ACETAMINOPHEN 5; 325 MG/1; MG/1
1 TABLET ORAL ONCE
Status: DISCONTINUED | OUTPATIENT
Start: 2019-04-25 | End: 2019-04-25 | Stop reason: HOSPADM

## 2019-04-25 RX ORDER — OXYCODONE HYDROCHLORIDE 5 MG/1
5-10 TABLET ORAL EVERY 4 HOURS PRN
Qty: 12 TABLET | Refills: 0 | Status: SHIPPED | OUTPATIENT
Start: 2019-04-25 | End: 2020-10-02

## 2019-04-25 RX ADMIN — PROPOFOL 200 MCG/KG/MIN: 10 INJECTION, EMULSION INTRAVENOUS at 17:45

## 2019-04-25 RX ADMIN — SODIUM CHLORIDE, POTASSIUM CHLORIDE, SODIUM LACTATE AND CALCIUM CHLORIDE: 600; 310; 30; 20 INJECTION, SOLUTION INTRAVENOUS at 17:36

## 2019-04-25 RX ADMIN — ONDANSETRON 4 MG: 2 INJECTION INTRAMUSCULAR; INTRAVENOUS at 18:46

## 2019-04-25 RX ADMIN — FENTANYL CITRATE 50 MCG: 50 INJECTION, SOLUTION INTRAMUSCULAR; INTRAVENOUS at 18:54

## 2019-04-25 RX ADMIN — OXYCODONE HYDROCHLORIDE 5 MG: 5 TABLET ORAL at 19:38

## 2019-04-25 RX ADMIN — PROPOFOL 200 MG: 10 INJECTION, EMULSION INTRAVENOUS at 17:43

## 2019-04-25 RX ADMIN — FENTANYL CITRATE 25 MCG: 50 INJECTION, SOLUTION INTRAMUSCULAR; INTRAVENOUS at 18:04

## 2019-04-25 RX ADMIN — PROPOFOL 50 MG: 10 INJECTION, EMULSION INTRAVENOUS at 18:54

## 2019-04-25 RX ADMIN — LIDOCAINE HYDROCHLORIDE 100 MG: 20 INJECTION, SOLUTION INFILTRATION; PERINEURAL at 17:43

## 2019-04-25 RX ADMIN — ACETAMINOPHEN 650 MG: 325 TABLET, FILM COATED ORAL at 19:42

## 2019-04-25 RX ADMIN — DEXAMETHASONE SODIUM PHOSPHATE 8 MG: 4 INJECTION, SOLUTION INTRAMUSCULAR; INTRAVENOUS at 17:59

## 2019-04-25 RX ADMIN — TOLTERODINE TARTRATE 4 MG: 4 CAPSULE, EXTENDED RELEASE ORAL at 20:17

## 2019-04-25 RX ADMIN — OXYCODONE HYDROCHLORIDE 5 MG: 5 TABLET ORAL at 20:15

## 2019-04-25 RX ADMIN — FENTANYL CITRATE 25 MCG: 50 INJECTION, SOLUTION INTRAMUSCULAR; INTRAVENOUS at 18:01

## 2019-04-25 RX ADMIN — MIDAZOLAM 2 MG: 1 INJECTION INTRAMUSCULAR; INTRAVENOUS at 17:36

## 2019-04-25 RX ADMIN — CEFAZOLIN SODIUM 2 G: 2 INJECTION, SOLUTION INTRAVENOUS at 17:54

## 2019-04-25 ASSESSMENT — MIFFLIN-ST. JEOR: SCORE: 1662.63

## 2019-04-25 NOTE — DISCHARGE INSTRUCTIONS
Same-Day Surgery   Adult Discharge Orders & Instructions     For 24 hours after surgery:  1. Get plenty of rest.  A responsible adult must stay with you for at least 24 hours after you leave the hospital.   2. Pain medication can slow your reflexes. Do not drive or use heavy equipment.  If you have weakness or tingling, don't drive or use heavy equipment until this feeling goes away.  3. Mixing alcohol and pain medication can cause dizziness and slow your breathing. It can even be fatal. Do not drink alcohol while taking pain medication.  4. Avoid strenuous or risky activities.  Ask for help when climbing stairs.   5. You may feel lightheaded.  If so, sit for a few minutes before standing.  Have someone help you get up.   6. If you have nausea (feel sick to your stomach), drink only clear liquids such as apple juice, ginger ale, broth or 7-Up.  Rest may also help.  Be sure to drink enough fluids.  Move to a regular diet as you feel able. Take pain medications with a small amount of solid food, such as toast or crackers, to avoid nausea.   7. A slight fever is normal. Call the doctor if your fever is over 100 F (37.7 C) (taken under the tongue) or lasts longer than 24 hours.  8. You may have a dry mouth, muscle aches, trouble sleeping or a sore throat.  These symptoms should go away after 24 hours.  9. Do not make important or legal decisions.   Pain Management:      1. Take pain medication (if prescribed) for pain as directed by your physician.        2. WARNING: If the pain medication you have been prescribed contains Tylenol (acetaminophen), DO NOT take additional doses of Tylenol (acetaminophen). Ask nurse when tylenol (acetaminophen) was given last.      Call your doctor for any of the followin.  Signs of infection (fever, growing tenderness at the surgery site, severe pain, a large amount of drainage or bleeding, foul-smelling drainage, redness, swelling).    2.  It has been over 8 to 10 hours since  surgery and you are still not able to urinate (pee).    3.  Headache for over 24 hours.      To contact a doctor, call clinic # [phone] or:      [phone] and ask for the Resident On Call for: Urology (answered 24 hours a day)      Emergency Department:  [place] [facility]: [phone]  [place] [facility]: [phone]               Rev. 10/2014   Discharge Instructions: Following a Cystoscopy/Stent and/or Ureteroscopy    Drainage:    Urine may be slightly bloody but should taper off in a few days.    You may have some frequency and urgency for a few days.    Comfort:    A burning sensation when urinating is common. Drinking extra fluids helps decrease this burning feeling and also helps to clear the bloody urine.    Mild abdominal cramps may occur for a few days.    Baths:    Daily tub baths aide in passing urine.    Frequent use of bubble bath is discouraged since it encourages urinary tract infections.    Report to your doctor at once if you experience:    Inability to pass your urine    Continuous or heavy bleeding    Severe Pain    Elevated temperature > than 101.5 for 24 hours    Home Activity:    The day of surgery spend a quiet evening at home.    Increase activity as tolerated.    Rev. 5/12

## 2019-04-25 NOTE — H&P
Chief Complaint:   Right ureteral Stone         History of Present Illness:    Capri Albert is a very pleasant 33 year old female who presents with a history of right ureteral stone in solitary kidney.  She was born with one kidney.  Has one prior episode of stones where she had a stent 20 or so years ago.  Unclear what the stone was made of, did not have metabolic testing.  Had gross hematuria one week ago prompting CT eval by PMND where a 5 mm renal and proximal ureteral stone was diagnosed.  Stone has not passed.  She is havijng bothersome pain and persistent hematuria.  Upon being notified this AM about the stone we immediately planned for management given solitary kidney status.  Denies fevers, chills.      First stone: 16 years old  Number of stone surgeries: 1  Number of stones passed spontaneously: 0  History of UTI: no  Prior Stone Analysis: unclear  Family History Nephrolithasis: brother  Stone Risk Factors: none  Prior Metabolic Workup: no         Past Medical History:     Past Medical History:   Diagnosis Date     Cervical high risk HPV (human papillomavirus) test positive 2014:Pap--NIL, +High Risk HPV (NOT type 16 or 18). Plan to repeat pap + HPV cotesting in 1 year. 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     PONV (postoperative nausea and vomiting)      Preeclampsia             Past Surgical History:     Past Surgical History:   Procedure Laterality Date     C/SECTION, LOW TRANSVERSE      , Low Transverse      SECTION  9/10/2013    Procedure:  SECTION;;  Surgeon: Isac Palmer MD;  [facility]     FASCIOTOMY LOWER EXTREMITY Right 2016    Procedure: FASCIOTOMY LOWER  EXTREMITY;  Surgeon: Joshua Arrieta DPM;  Location: [facility]     LAPAROSCOPIC CHOLECYSTECTOMY  7/24/2012    Procedure: LAPAROSCOPIC CHOLECYSTECTOMY;  Laparoscopic Cholecystectomy;  Surgeon: Kvng Borden MD;  Location: [facility]     LAPAROTOMY EXPLORATORY  7/24/2012    Procedure: LAPAROTOMY EXPLORATORY;;  Surgeon: Kvng Borden MD;  Location: [facility]     RELEASE PLANTAR FASCIA Left 9/20/2016    Procedure: RELEASE PLANTAR FASCIA;  Surgeon: Joshua Arrieta DPM;  Location: [facility]     SURGICAL HISTORY OF -   8/19/2004    Right Knee Arthroscopy & Partial Medial and lateral Meniscectomies & Allograft reconstruction of the anterior cruciate ligament tear     SURGICAL HISTORY OF -   11/8/2000    Cystoscopy, right retrograde study     SURGICAL HISTORY OF -   2000    Kidney Stone Surgery     SURGICAL HISTORY OF -   7/2000    Obstructed hemivagina with hematometra and hematocolpos & Complete uterovaginal duplication. & Excision of Left vaginal septum and drainage of hematocolpos     SURGICAL HISTORY OF -       IVP with tomograms that showed absent of left kidney,      SURGICAL HISTORY OF -       Septoplasty            Medications     Current Facility-Administered Medications   Medication     ceFAZolin (ANCEF) 1 g vial to attach to  ml bag for ADULT or 50 ml bag for PEDS     lactated ringers infusion     lidocaine (LMX4) cream     lidocaine 1 % 0.1-1 mL     sodium chloride (PF) 0.9% PF flush 3 mL     sodium chloride (PF) 0.9% PF flush 3 mL     Facility-Administered Medications Ordered in Other Encounters   Medication     0.9 % sodium chloride IV solution     naloxone (NARCAN) injection 0.1-0.4 mg     sodium chloride (PF) 0.9% PF flush 3 mL     sodium chloride (PF) 0.9% PF flush 3 mL            Family History:     Family History   Problem Relation Age of Onset     Hypertension Mother      Alcohol/Drug Father         recovered alcohol     Cardiovascular Paternal Grandfather 30        MI     Cardiovascular Paternal  "Grandmother 60        MI     Cardiovascular Maternal Grandfather 60        MI            Social History:     Social History     Socioeconomic History     Marital status: Single     Spouse name: Caleb     Number of children: 4     Years of education: Not on file     Highest education level: Not on file   Occupational History     Employer: UNEMPLOYED     Employer: [employer]OMEM[employer]   Social Needs     Financial resource strain: Not on file     Food insecurity:     Worry: Not on file     Inability: Not on file     Transportation needs:     Medical: Not on file     Non-medical: Not on file   Tobacco Use     Smoking status: Never Smoker     Smokeless tobacco: Never Used   Substance and Sexual Activity     Alcohol use: Yes     Comment: occas-      Drug use: No     Sexual activity: Not Currently     Partners: Male   Lifestyle     Physical activity:     Days per week: Not on file     Minutes per session: Not on file     Stress: Not on file   Relationships     Social connections:     Talks on phone: Not on file     Gets together: Not on file     Attends Baptism service: Not on file     Active member of club or organization: Not on file     Attends meetings of clubs or organizations: Not on file     Relationship status: Not on file     Intimate partner violence:     Fear of current or ex partner: Not on file     Emotionally abused: Not on file     Physically abused: Not on file     Forced sexual activity: Not on file   Other Topics Concern     Parent/sibling w/ CABG, MI or angioplasty before 65F 55M? No   Social History Narrative     Not on file            Allergies:   Patient has no known allergies.         Review of Systems:  From intake questionnaire   Negative 14 system review except as noted on HPI, nurse's note.         Physical Exam:   Patient is a 33 year old  female   Vitals: Blood pressure (!) 135/93, pulse 76, temperature 98.1  F (36.7  C), temperature source Oral, resp. rate 20, height 1.702 m (5' 7\"), weight 93.5 kg (206 " lb 2.1 oz), last menstrual period 03/26/2019, SpO2 97 %.  General Appearance Adult: Alert, no acute distress, oriented  HENT: throat/mouth:normal, good dentition  Neck: No adenopathy,masses or thyromegaly  Lungs: no respiratory distress, or pursed lip breathing  Heart: No obvious jugular venous distension present  Abdomen: soft, nontender, no organomegaly or masses, Body mass index is 32.28 kg/m .  Lymphatics: No cervical or supraclavicular adenopathy  Musculoskeltal: extremities normal, no peripheral edema  Skin: no suspicious lesions or rashes  Neuro: Alert, oriented, speech and mentation normal  Psych: affect and mood normal  Gait: Normal        Labs and Pathology:    I personally reviewed all applicable laboratory data and went over findings with patient  Significant for:    CBC RESULTS:  Recent Labs   Lab Test 01/28/19  1426 09/11/13  1412 09/11/13  0450 09/10/13  1833 08/23/13  0744 08/20/13  1300   WBC 9.1  --  17.4*  --  12.7* 16.2*   HGB 13.7 10.6* 10.9* 12.7 11.7 12.5     --  148*  --  208 215        BMP RESULTS:  Recent Labs   Lab Test 01/28/19  1426 11/27/12  1141 07/27/12  0615 07/26/12  0610    143 137 137   POTASSIUM 3.7 4.2 3.5 3.5   CHLORIDE 102 101 101 104   CO2 31 29 26 26   ANIONGAP 4 13 9 6   GLC 89 82 87 94   BUN 13 17 3* 7   CR 0.80 0.78 0.51* 0.57   GFRESTIMATED >90 87 >90 >90   GFRESTBLACK >90 >90 >90 >90   ELLA 9.1 9.3 7.7* 7.2*       UA RESULTS:   Recent Labs   Lab Test 04/18/19  0800 08/23/13  1130 08/20/13  1100   SG 1.015 1.015 1.011   URINEPH 6.5 5.5 7.5*   NITRITE Negative Negative Negative   RBCU 10-25* 31* 6*   WBCU 0 - 5 2 3*       CALCIUM RESULTS  Lab Results   Component Value Date    ELLA 9.1 01/28/2019    ELLA 9.3 11/27/2012    ELLA 7.7 07/27/2012       PSA RESULTS  No results found for: PSA    INR  Recent Labs   Lab Test 09/11/13  0450 07/25/12  0620 07/24/12  1915 07/24/12  1345   INR 0.95 1.28* 1.32* 1.17*           Imaging:    I personally reviewed all applicable  imaging and went over the below findings with patient.    Results for orders placed or performed during the hospital encounter of 04/18/19   CT Abdomen Pelvis w/o Contrast    Narrative    CT ABDOMEN AND PELVIS WITHOUT CONTRAST   4/18/2019 8:59 AM     HISTORY: Hematuria, unknown cause. Rule out renal stones. Gross  hematuria. History of kidney stones. Congenital renal agenesis and  dysgenesis.    TECHNIQUE: No IV contrast material. Radiation dose for this scan was  reduced using automated exposure control, adjustment of the mA and/or  kV according to patient size, or iterative reconstruction technique.    COMPARISON: None.    FINDINGS: There is a 5 mm calculus at the right ureteropelvic junction  and an additional 5 mm calculus in the right renal collecting system.  No hydronephrosis or hydroureter on the right. No bladder calculi  demonstrated. There are no contour deforming renal masses on the  right. There is agenesis of the left kidney.    Within the limits of an unenhanced study, the liver, adrenal glands,  spleen, and pancreas are unremarkable. No abdominal or retroperitoneal  lymphadenopathy. The appendix is normal. No bowel obstruction, free  air, or ascites. No pelvic adenopathy, free fluid, or mass. Tubal  interruption devices are noted.      Impression    IMPRESSION: There is a 5 mm calcification at the right ureteropelvic  junction and another 5 mm calcification in the right renal collecting  system. No hydronephrosis or hydroureter.    PATY HERRERA MD              Assessment and Plan:     Assessment:33 year old female with right ureteral stone in solitary kidney    Plan:  We discussed the nature of ureteral stones and obstruction.  We discussed spontaneous stone passage, medical expulsive therapy as well as stone removal.  The patient has ultimately decided to proceed with ureteroscopy given solitary kidney status.  -Update BMP  -We discussed the risks of bleeding, infection, incomplete stone  removal, damage to adjacent organs, and need for staged procedures.        I, Cricket Rainey saw and evaluated this patient and agree with the plan as stated above.  I personally performed all listed procedures.

## 2019-04-25 NOTE — TELEPHONE ENCOUNTER
[facility] Call Center    Phone Message    May a detailed message be left on voicemail: yes    Reason for Call: Other: Pt called in and stated she is supposed to come in today at 11am for a procedure, she needs to speak to someone regarding this, tried priority line - no answer, please call pt back right away     Action Taken: Message routed to:  [facility] ([facility]): urology

## 2019-04-25 NOTE — ANESTHESIA PREPROCEDURE EVALUATION
Anesthesia Pre-Procedure Evaluation    Patient: Capri Albert   MRN:     [id number] Gender:   female   Age:    33 year old :      1985        Preoperative Diagnosis: Stone   Procedure(s):  CYSTOURETEROSCOPY, WITH HOLMIUM LASER LITHOTRIPSY OF URETERAL CALCULUS AND STENT INSERTION     Past Medical History:   Diagnosis Date     Cervical high risk HPV (human papillomavirus) test positive 2014:Pap--NIL, +High Risk HPV (NOT type 16 or 18). Plan to repeat pap + HPV cotesting in 1 year. 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     PONV (postoperative nausea and vomiting)      Preeclampsia       Past Surgical History:   Procedure Laterality Date     C/SECTION, LOW TRANSVERSE      , Low Transverse      SECTION  9/10/2013    Procedure:  SECTION;;  Surgeon: Isac Palmer MD;  [facility]     FASCIOTOMY LOWER EXTREMITY Right 2016    Procedure: FASCIOTOMY LOWER EXTREMITY;  Surgeon: Joshua Arrieta DPM;  Location: [facility]     LAPAROSCOPIC CHOLECYSTECTOMY  2012    Procedure: LAPAROSCOPIC CHOLECYSTECTOMY;  Laparoscopic Cholecystectomy;  Surgeon: Kvng Borden MD;  Location: [facility]     LAPAROTOMY EXPLORATORY  2012    Procedure: LAPAROTOMY EXPLORATORY;;  Surgeon: Kvng Borden MD;  Location: [facility]     RELEASE PLANTAR FASCIA Left 2016    Procedure: RELEASE PLANTAR FASCIA;  Surgeon: Joshua Arrieta DPM;  Location: [facility]     SURGICAL HISTORY OF -   2004    Right Knee Arthroscopy & Partial Medial and lateral Meniscectomies & Allograft reconstruction of the anterior cruciate ligament tear     SURGICAL HISTORY OF -   2000    Cystoscopy, right retrograde study     SURGICAL  HISTORY OF -   2000    Kidney Stone Surgery     SURGICAL HISTORY OF -   7/2000    Obstructed hemivagina with hematometra and hematocolpos & Complete uterovaginal duplication. & Excision of Left vaginal septum and drainage of hematocolpos     SURGICAL HISTORY OF -       IVP with tomograms that showed absent of left kidney,      SURGICAL HISTORY OF -       Septoplasty          Anesthesia Evaluation     . Pt has had prior anesthetic.     History of anesthetic complications   - PONV        ROS/MED HX    ENT/Pulmonary:  - neg pulmonary ROS     Neurologic:  - neg neurologic ROS   (+)migraines,     Cardiovascular:  - neg cardiovascular ROS   (+) hypertension----. : . . . :. .       METS/Exercise Tolerance:     Hematologic:  - neg hematologic  ROS       Musculoskeletal:  - neg musculoskeletal ROS       GI/Hepatic:  - neg GI/hepatic ROS       Renal/Genitourinary: Comment: Absent Left Kidney - ROS Renal section negative       Endo:  - neg endo ROS   (+) Obesity, .      Psychiatric:  - neg psychiatric ROS       Infectious Disease:  - neg infectious disease ROS       Malignancy:      - no malignancy   Other:    - neg other ROS                     PHYSICAL EXAM:   Mental Status/Neuro: A/A/O   Airway: Facies: Feasible  Mallampati: I  Mouth/Opening: Full  TM distance: > 6 cm  Neck ROM: Full   Respiratory: Auscultation: CTAB     Resp. Rate: Normal     Resp. Effort: Normal      CV: Rhythm: Regular  Rate: Age appropriate  Heart: Normal Sounds   Comments:      Dental: Normal                  Lab Results   Component Value Date    WBC 7.8 04/25/2019    HGB 13.2 04/25/2019    HCT 39.0 04/25/2019     04/25/2019    SED 9 11/27/2012     04/25/2019    POTASSIUM 3.6 04/25/2019    CHLORIDE 106 04/25/2019    CO2 27 04/25/2019    BUN 11 04/25/2019    CR 0.72 04/25/2019    GLC 91 04/25/2019    ELLA 8.4 (L) 04/25/2019    ALBUMIN 4.0 01/28/2019    PROTTOTAL 7.1 01/28/2019    ALT 46 01/28/2019    AST 23 01/28/2019    ALKPHOS 65  "01/28/2019    BILITOTAL 0.3 01/28/2019    LIPASE 186 01/28/2019    PTT 29 09/11/2013    INR 0.95 09/11/2013    FIBR 528 (H) 09/11/2013    TSH 1.50 11/26/2012    HCG Negative 04/25/2019       Preop Vitals  BP Readings from Last 3 Encounters:   04/25/19 (!) 135/93   04/18/19 116/86   01/28/19 149/80    Pulse Readings from Last 3 Encounters:   04/25/19 76   04/18/19 85   01/28/19 72      Resp Readings from Last 3 Encounters:   04/25/19 20   04/18/19 14   01/28/19 16    SpO2 Readings from Last 3 Encounters:   04/25/19 97%   04/18/19 98%   01/28/19 99%      Temp Readings from Last 1 Encounters:   04/25/19 36.7  C (98.1  F) (Oral)    Ht Readings from Last 1 Encounters:   04/25/19 1.702 m (5' 7\")      Wt Readings from Last 1 Encounters:   04/25/19 93.5 kg (206 lb 2.1 oz)    Estimated body mass index is 32.28 kg/m  as calculated from the following:    Height as of this encounter: 1.702 m (5' 7\").    Weight as of this encounter: 93.5 kg (206 lb 2.1 oz).     LDA:  Peripheral IV 06/26/12 Left Upper arm (Active)   Number of days: 2494       Peripheral IV 07/24/12 Left Lower forearm (Active)   Number of days: 2466       Peripheral IV 04/25/19 Right Hand (Active)   Number of days: 0       Closed/Suction Drain 1 Right Foot Bulb 7 Czech (Active)   Number of days: 870            Assessment:   ASA SCORE: 1    NPO Status: > 6 hours since completed Solid [employer]   Documentation: H&P complete; Preop Testing complete; Consents complete   Proceeding: Proceed without further delay  Tobacco Use:  NO Active use of Tobacco/UNKNOWN Tobacco use status     Plan:   Anes. Type:  General   Pre-Induction: Midazolam IV; Acetaminophen PO   Induction:  IV (Standard)   Airway: LMA   Access/Monitoring: PIV   Maintenance: Balanced   Emergence: Procedure Site   Logistics: Same Day Surgery     Postop Pain/Sedation Strategy:  Standard-Options: Opioids PRN     PONV Management:  Adult Risk Factors: Female, H/o PONV or Motion Sickness, Non-Smoker, Postop " Opioids  Prevention: Ondansetron; Dexamethasone; Propofol Infusion     CONSENT: Direct conversation   Plan and risks discussed with: Patient   Blood Products: Consent Deferred (Minimal Blood Loss)                         Filemon Garduno MD

## 2019-04-25 NOTE — TELEPHONE ENCOUNTER
Patient called and sent patient to [place] or oren johnson rn care coordinator. Colette Oh LPN Staff Nurse

## 2019-04-26 DIAGNOSIS — N20.0 KIDNEY STONE: Primary | ICD-10-CM

## 2019-04-26 NOTE — OP NOTE
OPERATIVE REPORT    PREOPERATIVE DIAGNOSIS:  Right ureteral and renal stone(s)    POSTOPERATIVE DIAGNOSIS: Same    PROCEDURES PERFORMED:   -Cystourethroscopy  -Right retrograde pyelogram with intraoperative interpretation of images  -Right ureteroscopy  -Right holmium laser lithotripsy  -Right basket stone retrieval  -Right ureteral stent placement    STAFF SURGEON: Cricket Rainey MD  RESIDENT(S): Francois Sheehan MD    ANESTHESIA: General  ESTIMATED BLOOD LOSS: 1 ml  COMPLICATIONS: None.   SPECIMEN: Stone for analysis   URETERAL STENT(S):  - Right 7 x 26 cm double-J ureteral stent.  Reason for stenting: Absence or anatomic abnormality of contralateral kidney (specifically congenital absence of left kidney).      SIGNIFICANT FINDINGS:   -Stone free with no fragments > 2mm on the right  -Unremarkable right RPG    BRIEF OPERATIVE INDICATIONS: Capri Albert is a(n) 33 year old female who presented with a 5 mm ureteral stone in a solitary kidney as well as a 5 mm renal stone.  After a discussion of all risks, benefits, and alternatives, the patient elected to proceed with definitive stone management. The patient understands the potential need for more than one procedure to eliminate all stone burden.      DESCRIPTION OF PROCEDURE:  After informed consent was obtained, the patient was transported to the operating room & placed supine on the table. After adequate anesthesia was induced, the patient was placed in lithotomy and prepped and draped in the usual sterile fashion. A timeout was taken to confirm correct patient, procedure and laterality. Pre-operative IV antibiotics were administered.     A 22-Norwegian rigid cystoscope was inserted into a well-lubricated urethra. The urethra was unremarkable without stricture.     The right ureteral orifice was identified and cannulated with a sensor wire with the aid of a 5F open-ended catheter. The wire passed without resistance into the upper pole.  The 5F open-ended catheter  was advanced, the wire was removed and a retrograde pyelogram was performed revealing an unremarkable upper tract..  The sensor wire was reintroduced and the 5F catheter was removed.    A semi-rigid ureteroscope was introduced and advanced up the ureter along side the sensor wire.  An approximately 5 mm stone was identified in the mid ureter.  A 200 micron laser fiber was used at a setting of 0.6 J and 6 Hz and lithotripsy was performed.  A tipless basket was used to remove all fragments greater than 2 mm.  Pullback ureteroscopy showed no residual stone fragments or significant- ureteral injury.     We then passed a flexible ureteroscope up to the right kidney where we identified a 5 mm renal stone.  WE fragmented this with the 200 micron holmium laser and removed all fragments > 2mm.  We performed nephroscopy inspecting each calux for any remnant stones and there were none.  Of note, there was dense papillary plaque and pitting of nearly every papilla.  Pullback ureteroscopy was unremarkable.      A 7 x 26 cm double-J stent was advanced over the Sensor wire, and a good proximal curl was seen in the renal pelvis on fluoroscopy and the distal curl was seen in the bladder. A string was left attached to the stent.  The bladder was drained.            The patient tolerated the procedure well and there were no apparent complications. The patient  was transported to the postanesthesia care unit in stable condition.     POSTOP PLAN:  -Stent removal via string in one week

## 2019-04-26 NOTE — BRIEF OP NOTE
[facility], [place]    Brief Operative Note    Pre-operative diagnosis: Stone  Post-operative diagnosis * No post-op diagnosis entered *  Procedure: Procedure(s):  CYSTOURETEROSCOPY, WITH HOLMIUM LASER LITHOTRIPSY OF URETERAL CALCULUS AND STENT INSERTION  Surgeon: Surgeon(s) and Role:     * Cricket Rainey MD - Primary     * Francois Sheehan MD - Resident - Assisting  Anesthesia: General   Estimated blood loss: Minimal  Drains: Right 7 Fr x 26cm ureteral stone  Specimens:   ID Type Source Tests Collected by Time Destination   1 : Right Ureteral and Renal Stones Calculus/Stone Ureter, Right STONE ANALYSIS Cricket Rainey MD 4/25/2019  6:46 PM      Findings:   right ureteral and renal stones fragmented and basketed atraumatically, placement of right 6 Frx 26cm ureteral stent.  Complications: None.  Implants:    Implant Name Type Inv. Item Serial No.  Lot No. LRB No. Used   STENT URETERAL PERCUFLEX PLUS [id number] Stent STENT URETERAL PERCUFLEX PLUS [id number]  [employer] [id number] Right 1   STENT URETERAL PERCUFLEX PLUS [id number] Stent STENT URETERAL PERCUFLEX PLUS [id number]  [employer] [id number] Right 1

## 2019-04-26 NOTE — ANESTHESIA POSTPROCEDURE EVALUATION
Anesthesia POST Procedure Evaluation    Patient: Capri Albert   MRN:     [id number] Gender:   female   Age:    33 year old :      1985        Preoperative Diagnosis: Stone   Procedure(s):  CYSTOURETEROSCOPY, WITH HOLMIUM LASER LITHOTRIPSY OF URETERAL CALCULUS AND STENT INSERTION   Postop Comments: No value filed.       Anesthesia Type:  General  No value filed.    Reportable Event: NO     PAIN: Uncomplicated   Sign Out status: Comfortable, Well controlled pain     PONV: No PONV   Sign Out status:  No Nausea or Vomiting     Neuro/Psych: Uneventful perioperative course   Sign Out Status: Preoperative baseline; Age appropriate mentation     Airway/Resp.: Uneventful perioperative course   Sign Out Status: Non labored breathing, age appropriate RR; Resp. Status within EXPECTED Parameters     CV: Uneventful perioperative course   Sign Out status: Appropriate BP and perfusion indices; Appropriate HR/Rhythm     Disposition:   Sign Out in:  PACU  Disposition:  Phase II; Home  Recovery Course: Uneventful  Follow-Up: Not required           Last Anesthesia Record Vitals:  CRNA VITALS  2019 1835 - 2019 1935      2019             Resp Rate (observed):  2  (Abnormal)           Last PACU Vitals:  Vitals Value Taken Time   BP     Temp     Pulse     Resp 9 2019  7:25 PM   SpO2 98 % 2019  7:26 PM   Temp src     NIBP     Pulse     SpO2     Resp     Temp     Ht Rate     Temp 2     Vitals shown include unvalidated device data.      Electronically Signed By: Filemon Garduno MD, 2019, 7:41 PM

## 2019-04-26 NOTE — ANESTHESIA CARE TRANSFER NOTE
Patient: Capri Albert    Procedure(s):  CYSTOURETEROSCOPY, WITH HOLMIUM LASER LITHOTRIPSY OF URETERAL CALCULUS AND STENT INSERTION    Diagnosis: Stone  Diagnosis Additional Information: No value filed.    Anesthesia Type:   No value filed.     Note:  Airway :Face Mask  Patient transferred to:PACU  Handoff Report: Identifed the Patient, Identified the Reponsible Provider, Reviewed the pertinent medical history, Discussed the surgical course, Reviewed Intra-OP anesthesia mangement and issues during anesthesia, Set expectations for post-procedure period and Allowed opportunity for questions and acknowledgement of understanding      Vitals: (Last set prior to Anesthesia Care Transfer)    CRNA VITALS  4/25/2019 1835 - 4/25/2019 1912      4/25/2019             Resp Rate (observed):  2  (Abnormal)                 Electronically Signed By: MARCIN Lopez CRNA  April 25, 2019  7:12 PM

## 2019-04-28 LAB
APPEARANCE STONE: NORMAL
COMPN STONE: NORMAL
NUMBER STONE: 3
SIZE STONE: NORMAL MM
WT STONE: 27 MG

## 2019-04-29 ENCOUNTER — PATIENT OUTREACH (OUTPATIENT)
Dept: UROLOGY | Facility: CLINIC | Age: 34
End: 2019-04-29

## 2019-04-29 NOTE — PROGRESS NOTES
POST OP ELLA-line is busy will try later. Called and spoke with patient.    Capri Albert,    How are you doing after your surgical procedure with DR. Rainey? good    Any fever, chills, nausea or vomiting? no  How is your appetite? normal  Are you drinking enough fluids? yes  Have you had a bowel movement since you have been home? yes  Are you having any difficulties with urination?  no  Any problems with your catheter? NA  Is your urine clear yellow? yes  How does your incision look? NA  How is your pain? denies  Do you have any questions or concerns? no    Please feel free to reply via [employer] or contact the clinic.  [phone], option #3 to speak to the nurse

## 2019-04-30 ENCOUNTER — TELEPHONE (OUTPATIENT)
Dept: UROLOGY | Facility: CLINIC | Age: 34
End: 2019-04-30

## 2019-04-30 NOTE — TELEPHONE ENCOUNTER
[facility] Call Center    Phone Message    May a detailed message be left on voicemail: yes    Reason for Call: Other: PT wanting to know when to remove Stint. Please reach out to PT to discuss.      Action Taken: Message routed to:  [facility] ([facility]

## 2019-04-30 NOTE — TELEPHONE ENCOUNTER
I called to let the patient know she should remove the stent on Thursday of this week. Patient will do it at home. She declined a nurse visit.

## 2019-06-12 ENCOUNTER — PRE VISIT (OUTPATIENT)
Dept: UROLOGY | Facility: CLINIC | Age: 34
End: 2019-06-12

## 2019-06-18 ENCOUNTER — OFFICE VISIT (OUTPATIENT)
Dept: UROLOGY | Facility: CLINIC | Age: 34
End: 2019-06-18
Payer: COMMERCIAL

## 2019-06-18 ENCOUNTER — ANCILLARY PROCEDURE (OUTPATIENT)
Dept: CT IMAGING | Facility: CLINIC | Age: 34
End: 2019-06-18
Attending: UROLOGY
Payer: COMMERCIAL

## 2019-06-18 VITALS
WEIGHT: 210.7 LBS | DIASTOLIC BLOOD PRESSURE: 84 MMHG | BODY MASS INDEX: 33.07 KG/M2 | SYSTOLIC BLOOD PRESSURE: 128 MMHG | HEART RATE: 80 BPM | HEIGHT: 67 IN

## 2019-06-18 DIAGNOSIS — N20.0 KIDNEY STONE: ICD-10-CM

## 2019-06-18 DIAGNOSIS — N20.0 KIDNEY STONE: Primary | ICD-10-CM

## 2019-06-18 ASSESSMENT — PAIN SCALES - GENERAL: PAINLEVEL: NO PAIN (0)

## 2019-06-18 ASSESSMENT — MIFFLIN-ST. JEOR: SCORE: 1683.36

## 2019-06-18 NOTE — PATIENT INSTRUCTIONS
Please complete the LithoLink 24hr Urine collection x2 . They will mail you the kit with instructions on completing the collection and mailing it back to them. If you do not receive the LithoLink in 7-10 days please call [phone]. Once you complete the kit and return it, and we get the results we will contact you to schedule a follow up appointment if one is not already made.    It was a pleasure meeting with you today.  Thank you for allowing me and my team the privilege of caring for you today.  YOU are the reason we are here, and I truly hope we provided you with the excellent service you deserve.  Please let us know if there is anything else we can do for you so that we can be sure you are leaving completely satisfied with your care experience.        Juan M Toney

## 2019-06-18 NOTE — LETTER
"2019       RE: Capri Albert  [id number] [address] [place] [address]     Dear Colleague,    Thank you for referring your patient, Capri Albert, to the [facility] UROLOGY AND INST FOR PROSTATE AND UROLOGIC CANCERS at [facility]. Please see a copy of my visit note below.      Urology Clinic    Cricket Rainey MD  Date of Service: 2019     Name: Capri Albert  MRN: [id number]  Age: 33 year old  : 1985  Referring provider: Diamond Petit     Assessment and Plan:  Assessment:  Capri Albert is a 33 year old female with a solitary kidney and history of kidney stones s/p right ureteroscopy and laser lithotripsy on 2019. CT today without any residual stones and no hydronephrosis.     Plan:  Metabolic workup with 24 hour urine x 2.   ______________________________________________________________________    HPI  Capri Albert is a 33 year old female s/p right ureteroscopy and laser lithotripsy on 2019 for a 5 mm ureteral stone in a solitary kidney as well as a 5 mm renal stone.    Stone analysis showed 90% calcium oxalate dihydrate, and 10% calcium phosphate (hydroxy- and carbonate- apatite).     Today she is doing well without any pain or other symptoms.     Stone History is as Follows:  First stone: 15-20 years ago   Number of stone surgeries: 2, 15-20 years ago and 2019   Number of stones passed spontaneously: passed small stones during pregnancy   Prior Stone Analysis: 90% calcium oxalate dihydrate, and 10% calcium phosphate (hydroxy- and carbonate- apatite  Family history: no    Review of Systems:   Pertinent items are noted in HPI or as below, remainder of complete ROS is negative.      Physical Exam:   Patient is a 33 year old  female   Vitals: Blood pressure 128/84, pulse 80, height 1.702 m (5' 7\"), weight 95.6 kg (210 lb 11.2 oz).  Notable Findings on Exam: Well-nourished female in no apparent distress     Laboratory:   I personally " reviewed all applicable laboratory data and went over findings with patient  Significant for:    CBC RESULTS:  Recent Labs   Lab Test 04/25/19  1555 01/28/19  1426 09/11/13  1412 09/11/13  0450  08/23/13  0744   WBC 7.8 9.1  --  17.4*  --  12.7*   HGB 13.2 13.7 10.6* 10.9*   < > 11.7    226  --  148*  --  208    < > = values in this interval not displayed.        BMP RESULTS:  Recent Labs   Lab Test 04/25/19  1555 01/28/19  1426 11/27/12  1141 07/27/12  0615    137 143 137   POTASSIUM 3.6 3.7 4.2 3.5   CHLORIDE 106 102 101 101   CO2 27 31 29 26   ANIONGAP 8 4 13 9   GLC 91 89 82 87   BUN 11 13 17 3*   CR 0.72 0.80 0.78 0.51*   GFRESTIMATED >90 >90 87 >90   GFRESTBLACK >90 >90 >90 >90   ELLA 8.4* 9.1 9.3 7.7*       UA RESULTS:   Recent Labs   Lab Test 04/18/19  0800 08/23/13  1130 08/20/13  1100   SG 1.015 1.015 1.011   URINEPH 6.5 5.5 7.5*   NITRITE Negative Negative Negative   RBCU 10-25* 31* 6*   WBCU 0 - 5 2 3*       CALCIUM RESULTS  Lab Results   Component Value Date    ELLA 8.4 04/25/2019    ELLA 9.1 01/28/2019    ELLA 9.3 11/27/2012       INR  Recent Labs   Lab Test 09/11/13  0450 07/25/12  0620 07/24/12  1915 07/24/12  1345   INR 0.95 1.28* 1.32* 1.17*       Imaging:   I personally reviewed all applicable imaging and went over the below findings with patient.    Results for orders placed or performed in visit on 06/18/19   CT Abdomen pelvis w/o contrast    Narrative    EXAMINATION: CT ABDOMEN PELVIS W/O CONTRAST, 6/18/2019 8:20 AM    TECHNIQUE:  Helical CT images from the lung bases through the  symphysis pubis were obtained without contrast.  Coronal and sagittal  reformatted images were generated at a workstation for further  assessment.    COMPARISON: None.    HISTORY: Flank pain, recurrent stone disease suspected; Kidney stone    FINDINGS:  Kidneys: No renal stones or hydronephrosis. Left kidney is absent.     Lung bases: No consolidation or pleural effusion. Mosaic attenuation  of the lung  bases.    Liver: No suspicious liver lesions.  Gallbladder: Surgically absent.  Spleen: Normal size.  Pancreas: No suspicious pancreatic lesions. The pancreatic duct is not  dilated.  Adrenal glands: No adrenal nodules.  Bladder / Pelvic organs: Essure clips. Didelphous uterus.  GI: No bowel wall thickening. The appendix is unremarkable.  Lymph nodes: No retroperitoneal, mesenteric, or pelvic  lymphadenopathy.  Peritoneum / Retroperitoneum: No free fluid or air within the abdomen.  Vessels: No infrarenal aortic aneurysm.     Bones and soft tissues: Degenerative changes of the spine.      Impression    IMPRESSION:   1. No renal stones in the right kidney. The left kidney is absent.  2. Mosaic attenuation of the lungs, which can be seen in chronic small  airway disease.  3. Didelphous uterus.    I have personally reviewed the examination and initial interpretation  and I agree with the findings.    JONAH CARO MD       Scribe Disclosure:  I, Stephani Small, am serving as a scribe to document services personally performed by Cricket Rainey MD at this visit, based upon the provider's statements to me. All documentation has been reviewed by the aforementioned provider prior to being entered into the official medical record.     Again, thank you for allowing me to participate in the care of your patient.      Sincerely,    Cricket Rainey MD

## 2019-06-18 NOTE — PROGRESS NOTES
"  Urology Clinic    Cricket Rainey MD  Date of Service: 2019     Name: Capri Albert  MRN: [id number]  Age: 33 year old  : 1985  Referring provider: Diamond Petit     Assessment and Plan:  Assessment:  Capri Albert is a 33 year old female with a solitary kidney and history of kidney stones s/p right ureteroscopy and laser lithotripsy on 2019. CT today without any residual stones and no hydronephrosis.     Plan:  Metabolic workup with 24 hour urine x 2.   ______________________________________________________________________    HPI  Capri Albert is a 33 year old female s/p right ureteroscopy and laser lithotripsy on 2019 for a 5 mm ureteral stone in a solitary kidney as well as a 5 mm renal stone.    Stone analysis showed 90% calcium oxalate dihydrate, and 10% calcium phosphate (hydroxy- and carbonate- apatite).     Today she is doing well without any pain or other symptoms.     Stone History is as Follows:  First stone: 15-20 years ago   Number of stone surgeries: 2, 15-20 years ago and 2019   Number of stones passed spontaneously: passed small stones during pregnancy   Prior Stone Analysis: 90% calcium oxalate dihydrate, and 10% calcium phosphate (hydroxy- and carbonate- apatite  Family history: no    Review of Systems:   Pertinent items are noted in HPI or as below, remainder of complete ROS is negative.      Physical Exam:   Patient is a 33 year old  female   Vitals: Blood pressure 128/84, pulse 80, height 1.702 m (5' 7\"), weight 95.6 kg (210 lb 11.2 oz).  Notable Findings on Exam: Well-nourished female in no apparent distress     Laboratory:   I personally reviewed all applicable laboratory data and went over findings with patient  Significant for:    CBC RESULTS:  Recent Labs   Lab Test 19  1555 19  1426 13  1412 13  0450  13  0744   WBC 7.8 9.1  --  17.4*  --  12.7*   HGB 13.2 13.7 10.6* 10.9*   < > 11.7    226  --  148*  -- "  208    < > = values in this interval not displayed.        BMP RESULTS:  Recent Labs   Lab Test 04/25/19  1555 01/28/19  1426 11/27/12  1141 07/27/12  0615    137 143 137   POTASSIUM 3.6 3.7 4.2 3.5   CHLORIDE 106 102 101 101   CO2 27 31 29 26   ANIONGAP 8 4 13 9   GLC 91 89 82 87   BUN 11 13 17 3*   CR 0.72 0.80 0.78 0.51*   GFRESTIMATED >90 >90 87 >90   GFRESTBLACK >90 >90 >90 >90   ELLA 8.4* 9.1 9.3 7.7*       UA RESULTS:   Recent Labs   Lab Test 04/18/19  0800 08/23/13  1130 08/20/13  1100   SG 1.015 1.015 1.011   URINEPH 6.5 5.5 7.5*   NITRITE Negative Negative Negative   RBCU 10-25* 31* 6*   WBCU 0 - 5 2 3*       CALCIUM RESULTS  Lab Results   Component Value Date    ELLA 8.4 04/25/2019    ELLA 9.1 01/28/2019    ELLA 9.3 11/27/2012       INR  Recent Labs   Lab Test 09/11/13  0450 07/25/12  0620 07/24/12  1915 07/24/12  1345   INR 0.95 1.28* 1.32* 1.17*       Imaging:   I personally reviewed all applicable imaging and went over the below findings with patient.    Results for orders placed or performed in visit on 06/18/19   CT Abdomen pelvis w/o contrast    Narrative    EXAMINATION: CT ABDOMEN PELVIS W/O CONTRAST, 6/18/2019 8:20 AM    TECHNIQUE:  Helical CT images from the lung bases through the  symphysis pubis were obtained without contrast.  Coronal and sagittal  reformatted images were generated at a workstation for further  assessment.    COMPARISON: None.    HISTORY: Flank pain, recurrent stone disease suspected; Kidney stone    FINDINGS:  Kidneys: No renal stones or hydronephrosis. Left kidney is absent.     Lung bases: No consolidation or pleural effusion. Mosaic attenuation  of the lung bases.    Liver: No suspicious liver lesions.  Gallbladder: Surgically absent.  Spleen: Normal size.  Pancreas: No suspicious pancreatic lesions. The pancreatic duct is not  dilated.  Adrenal glands: No adrenal nodules.  Bladder / Pelvic organs: Essure clips. Didelphous uterus.  GI: No bowel wall thickening. The  appendix is unremarkable.  Lymph nodes: No retroperitoneal, mesenteric, or pelvic  lymphadenopathy.  Peritoneum / Retroperitoneum: No free fluid or air within the abdomen.  Vessels: No infrarenal aortic aneurysm.     Bones and soft tissues: Degenerative changes of the spine.      Impression    IMPRESSION:   1. No renal stones in the right kidney. The left kidney is absent.  2. Mosaic attenuation of the lungs, which can be seen in chronic small  airway disease.  3. Didelphous uterus.    I have personally reviewed the examination and initial interpretation  and I agree with the findings.    JONAH CARO MD       Scribe Disclosure:  I, Stephani Small, am serving as a scribe to document services personally performed by Cricket Rainey MD at this visit, based upon the provider's statements to me. All documentation has been reviewed by the aforementioned provider prior to being entered into the official medical record.

## 2019-06-18 NOTE — NURSING NOTE
"Chief Complaint   Patient presents with     Follow Up      stones, review CT       Blood pressure 128/84, pulse 80, height 1.702 m (5' 7\"), weight 95.6 kg (210 lb 11.2 oz). Body mass index is 33 kg/m .    Patient Active Problem List   Diagnosis     Congenital renal agenesis and dysgenesis     Cold sore     Congenital uterine anomaly     S/P laparoscopic procedure     CARDIOVASCULAR SCREENING; LDL GOAL LESS THAN 160     Encounter for post Essure sterilization check     Cervical high risk HPV (human papillomavirus) test positive     Obesity     Migraine without aura and without status migrainosus, not intractable       No Known Allergies    Current Outpatient Medications   Medication Sig Dispense Refill     Acetaminophen-Caffeine (EXCEDRIN TENSION HEADACHE PO) Take by mouth daily as needed       ibuprofen 200 MG capsule 4 tablets  capsule      oxyCODONE (ROXICODONE) 5 MG tablet Take 1-2 tablets (5-10 mg) by mouth every 4 hours as needed for moderate to severe pain (Patient not taking: Reported on 6/18/2019) 12 tablet 0     senna-docusate (SENOKOT-S/PERICOLACE) 8.6-50 MG tablet Take 1-2 tablets by mouth 2 times daily (Patient not taking: Reported on 6/18/2019) 30 tablet 0     tamsulosin (FLOMAX) 0.4 MG capsule Take 1 capsule (0.4 mg) by mouth daily (Patient not taking: Reported on 6/18/2019) 30 capsule 0     tolterodine ER (DETROL LA) 4 MG 24 hr capsule Take 1 capsule (4 mg) by mouth daily (Patient not taking: Reported on 6/18/2019) 30 capsule 0       Social History     Tobacco Use     Smoking status: Never Smoker     Smokeless tobacco: Never Used   Substance Use Topics     Alcohol use: Yes     Comment: occas-      Drug use: No       Jyothi Eaton LPN  6/18/2019  9:34 AM     "

## 2019-11-03 ENCOUNTER — HEALTH MAINTENANCE LETTER (OUTPATIENT)
Age: 34
End: 2019-11-03

## 2020-10-02 ENCOUNTER — VIRTUAL VISIT (OUTPATIENT)
Dept: FAMILY MEDICINE | Facility: CLINIC | Age: 35
End: 2020-10-02
Payer: COMMERCIAL

## 2020-10-02 DIAGNOSIS — J02.0 STREPTOCOCCAL SORE THROAT: Primary | ICD-10-CM

## 2020-10-02 PROCEDURE — 99213 OFFICE O/P EST LOW 20 MIN: CPT | Mod: 95 | Performed by: NURSE PRACTITIONER

## 2020-10-02 RX ORDER — PENICILLIN V POTASSIUM 500 MG/1
500 TABLET, FILM COATED ORAL 2 TIMES DAILY
Qty: 20 TABLET | Refills: 0 | Status: SHIPPED | OUTPATIENT
Start: 2020-10-02 | End: 2020-10-12

## 2020-10-02 NOTE — PROGRESS NOTES
"Capri Albert is a 35 year old female who is being evaluated via a billable telephone visit.      The patient has been notified of following:     \"This telephone visit will be conducted via a call between you and your physician/provider. We have found that certain health care needs can be provided without the need for a physical exam.  This service lets us provide the care you need with a short phone conversation.  If a prescription is necessary we can send it directly to your pharmacy.  If lab work is needed we can place an order for that and you can then stop by our lab to have the test done at a later time.    Telephone visits are billed at different rates depending on your insurance coverage. During this emergency period, for some insurers they may be billed the same as an in-person visit.  Please reach out to your insurance provider with any questions.    If during the course of the call the physician/provider feels a telephone visit is not appropriate, you will not be charged for this service.\"    Patient has given verbal consent for Telephone visit?  Yes    What phone number would you like to be contacted at? [phone]    How would you like to obtain your AVS? Mail a copy    Subjective     Capri Albert is a 35 year old female who presents via phone visit today for the following health issues:    HPI     Acute Illness  Acute illness concerns: sore throat   Onset/Duration: yesterday   Symptoms:  Fever: YES- haven't checked but had chills and sweats   Chills/Sweats: YES- both   Headache (location?): YES- frontal   Sinus Pressure: no  Conjunctivitis:  no  Ear Pain: no  Rhinorrhea: no  Congestion: no  Sore Throat: YES, red, swollen. Hurts to swallow on right side > left. Right tonsil enlarged, red, exudate +.    Cough: YES-non-productive  Wheeze: no  Decreased Appetite: YES  Nausea: no  Vomiting: no  Diarrhea: no  Dysuria/Freq.: no  Dysuria or Hematuria: no  Fatigue/Achiness: YES- both   Sick/Strep " Exposure: YES- daughter had strep 3 weeks ago  Therapies tried and outcome: advil-somewhat effective with the body aches       Review of Systems   Constitutional, HEENT, cardiovascular, pulmonary, gi and gu systems are negative, except as otherwise noted.       Objective          Vitals:  No vitals were obtained today due to virtual visit.    healthy, alert and no distress  PSYCH: Alert and oriented times 3; coherent speech, normal   rate and volume, able to articulate logical thoughts, able   to abstract reason, no tangential thoughts, no hallucinations   or delusions  Her affect is normal and pleasant  RESP: No cough, no audible wheezing, able to talk in full sentences  Remainder of exam unable to be completed due to telephone visits            Assessment/Plan:    Assessment & Plan     Streptococcal sore throat    - penicillin V (VEETID) 500 MG tablet; Take 1 tablet (500 mg) by mouth 2 times daily for 10 days        FUTURE APPOINTMENTS:       - Follow up in 3 days for symptoms that are not improving, sooner for new or worsening sx.     Patient Instructions     Patient Education     Pharyngitis: Strep (Presumed)    You have pharyngitis (sore throat). The healthcare staff think your sore throat is caused by streptococcus (strep) bacteria. This is often called strep throat. Strep throat can cause throat pain that is worse when swallowing, aching all over, headache, and fever. The infection is contagious. It may be spread by coughing, kissing, or touching others after touching your mouth or nose. Antibiotic medicine is given to treat the infection.  Home care    Rest at home. Drink plenty of fluids so you won t get dehydrated.    Stay home from work or school for the first 2 days of taking the antibiotics. After this time, you will not be contagious. You can then return to work or school if you are feeling better.     Take the antibiotic medicine for the full 10 days, even when you feel better. This is very important  to make sure the infection is fully treated. It is also important to prevent medicine-resistant germs from growing. If you were given an antibiotic shot, no more antibiotics are needed.    You may use acetaminophen or ibuprofen to control pain or fever, unless another medicine was prescribed for this. If you have chronic liver or kidney disease or ever had a stomach ulcer or GI bleeding, talk with your healthcare provider before using these medicines.    Use throat lozenges or a throat-numbing spray to help reduce throat pain. Gargling with warm salt water can also help reduce throat pain. Dissolve 1/2 teaspoon of salt in 1 glass of warm water.     Don t eat salty or spicy foods. These can irritate the throat.  Follow-up care  Follow up with your healthcare provider or our staff if you don't get better over the next week.  When to seek medical advice  Call your healthcare provider right away if any of these occur:    Fever as directed by your healthcare provider    New or worse ear pain, sinus pain, or headache    Painful lumps in the back of neck    Stiff neck    Lymph nodes that get larger    Can t swallow liquids, a lot of drooling, or can t open mouth wide due to throat pain    Signs of dehydration, such as very dark urine or no urine, sunken eyes, dizziness    Trouble breathing or noisy breathing    Muffled voice    New rash  Prevention  Here are steps you can take to help prevent an infection:    Keep good hand washing habits.    Don t have close contact with people who have sore throats, colds, or other upper respiratory infections.    Don t smoke, and stay away from secondhand smoke.    Stay up to date with of your vaccines.  Date Last Reviewed: 11/1/2017 2000-2019 [employer]. [address] [place] [address], [place] [address]. All rights reserved. This information is not intended as a substitute for professional medical care. Always follow your healthcare professional's instructions.                No follow-ups on file.    MARCIN Kitchen CNP  [facility]    Phone call duration:  8 minutes

## 2020-10-02 NOTE — PATIENT INSTRUCTIONS
Patient Education     Pharyngitis: Strep (Presumed)    You have pharyngitis (sore throat). The healthcare staff think your sore throat is caused by streptococcus (strep) bacteria. This is often called strep throat. Strep throat can cause throat pain that is worse when swallowing, aching all over, headache, and fever. The infection is contagious. It may be spread by coughing, kissing, or touching others after touching your mouth or nose. Antibiotic medicine is given to treat the infection.  Home care    Rest at home. Drink plenty of fluids so you won t get dehydrated.    Stay home from work or school for the first 2 days of taking the antibiotics. After this time, you will not be contagious. You can then return to work or school if you are feeling better.     Take the antibiotic medicine for the full 10 days, even when you feel better. This is very important to make sure the infection is fully treated. It is also important to prevent medicine-resistant germs from growing. If you were given an antibiotic shot, no more antibiotics are needed.    You may use acetaminophen or ibuprofen to control pain or fever, unless another medicine was prescribed for this. If you have chronic liver or kidney disease or ever had a stomach ulcer or GI bleeding, talk with your healthcare provider before using these medicines.    Use throat lozenges or a throat-numbing spray to help reduce throat pain. Gargling with warm salt water can also help reduce throat pain. Dissolve 1/2 teaspoon of salt in 1 glass of warm water.     Don t eat salty or spicy foods. These can irritate the throat.  Follow-up care  Follow up with your healthcare provider or our staff if you don't get better over the next week.  When to seek medical advice  Call your healthcare provider right away if any of these occur:    Fever as directed by your healthcare provider    New or worse ear pain, sinus pain, or headache    Painful lumps in the back of neck    Stiff  neck    Lymph nodes that get larger    Can t swallow liquids, a lot of drooling, or can t open mouth wide due to throat pain    Signs of dehydration, such as very dark urine or no urine, sunken eyes, dizziness    Trouble breathing or noisy breathing    Muffled voice    New rash  Prevention  Here are steps you can take to help prevent an infection:    Keep good hand washing habits.    Don t have close contact with people who have sore throats, colds, or other upper respiratory infections.    Don t smoke, and stay away from secondhand smoke.    Stay up to date with of your vaccines.  Date Last Reviewed: 11/1/2017 2000-2019 [employer] [address] [place] [address], [place] [address]. All rights reserved. This information is not intended as a substitute for professional medical care. Always follow your healthcare professional's instructions.

## 2020-10-12 ENCOUNTER — TELEPHONE (OUTPATIENT)
Dept: FAMILY MEDICINE | Facility: CLINIC | Age: 35
End: 2020-10-12

## 2020-10-12 DIAGNOSIS — J02.0 STREPTOCOCCAL SORE THROAT: Primary | ICD-10-CM

## 2020-10-12 NOTE — TELEPHONE ENCOUNTER
Reason for call:  Patient reporting a symptom    Symptom or request: Strep throat, sore, tonsils has white spots    Duration (how long have symptoms been present): Yesterday, 10/11/2020    Have you been treated for this before? Yes    Additional comments: Patient's strep throat has returned after finishing antibiotics prescribed by Krissy Rosario. Patient says meds took 4 days to work, then only had 3 good days. Now strep throat is back.    Phone Number patient can be reached at:  Home number on file [phone] (home)    Best Time:  Any    Can we leave a detailed message on this number:  YES    Call taken on 10/12/2020 at 10:39 AM by Kallie Valentine

## 2020-10-12 NOTE — TELEPHONE ENCOUNTER
Tried to call her and got voicemail.     Krissy, please see message below.   She had virtual visit 10/2 with you, and PCN for 10 days.     Do you want a visit ? Or ?   Thanks,   Ani Silva RNC

## 2020-10-22 ENCOUNTER — NURSE TRIAGE (OUTPATIENT)
Dept: NURSING | Facility: CLINIC | Age: 35
End: 2020-10-22

## 2020-10-22 NOTE — TELEPHONE ENCOUNTER
Pt calling  Sx started back again tues 10/20/2020  Fever  Off & on. Has not taken it today  R tonsil is very red,is swollen   10/2 started on PCN   10/12 started on Augmentin last dose today,  Sx are fully returning  Hurts to swallow but can eat  Body aches  Per protocol pt to be seen within 24 hrs  Pt agrees to go to [facility] if no appointments are available tomorrow  Reviewed care advice & when to call back  Pt agrees with plan  Maryjane Parry RN  [facility]      Additional Information    Negative: Severe difficulty breathing (e.g., struggling for each breath, speaks in single words, stridor)    Negative: Sounds like a life-threatening emergency to the triager    Negative: [1] Drooling or spitting out saliva (because can't swallow) AND [2] new onset    Negative: Unable to open mouth completely    Negative: Difficulty breathing (per caller) but not severe    Negative: [1] Fever > 103 F (39.4 C) AND [2] took antibiotic > 24 hours    Negative: [1] Drinking very little AND [2] dehydration suspected (e.g., no urine > 12 hours, very dry mouth, very lightheaded)    Negative: [1] Refuses to drink anything AND [2] for > 12 hours    Negative: Patient sounds very sick or weak to the triager    [1] Taking antibiotic > 48 hours (2 days) for strep throat AND [2] fever persists    Negative: [1] Taking antibiotic > 24 hours for strep throat AND [2] sore throat pain is SEVERE    Protocols used: STREP THROAT INFECTION ON ANTIBIOTIC FOLLOW-UP CALL-A-    COVID 19 Nurse Triage Plan/Patient Instructions    Please be aware that novel coronavirus (COVID-19) may be circulating in the community. If you develop symptoms such as fever, cough, or SOB or if you have concerns about the presence of another infection including coronavirus (COVID-19), please contact your health care provider or visit [email or web address].     Disposition/Instructions    In-Person Visit with provider recommended. Reference Visit Selection Guide.    Thank  you for taking steps to prevent the spread of this virus.  o Limit your contact with others.  o Wear a simple mask to cover your cough.  o Wash your hands well and often.    Resources    [facility]: About COVID-19: [email or web address]    CDC: What to Do If You're Sick: www.cdc.gov/coronavirus/2019-ncov/about/steps-when-sick.html    CDC: Ending Home Isolation: www.cdc.gov/coronavirus/2019-ncov/hcp/disposition-in-home-patients.html     CDC: Caring for Someone: www.cdc.gov/coronavirus/2019-ncov/if-you-are-sick/care-for-someone.html     [facility]: Interim Guidance for Hospital Discharge to Home: www.[facility].mn.[place]/diseases/coronavirus/hcp/hospdischarge.pdf    [facility] clinical trials (COVID-19 research studies): clinicalaffairs.[facility]-clinical-trials     Below are the COVID-19 hotlines at the [place] [facility] ([facility]). Interpreters are available.   o For health questions: Call 735-630-9811 or 1-903.764.6461 (7 a.m. to 7 p.m.)  o For questions about schools and childcare: Call 770-927-0485 or 1-201.626.2985 (7 a.m. to 7 p.m.)

## 2020-10-23 ENCOUNTER — OFFICE VISIT (OUTPATIENT)
Dept: FAMILY MEDICINE | Facility: CLINIC | Age: 35
End: 2020-10-23
Payer: COMMERCIAL

## 2020-10-23 VITALS
DIASTOLIC BLOOD PRESSURE: 82 MMHG | BODY MASS INDEX: 34.93 KG/M2 | SYSTOLIC BLOOD PRESSURE: 126 MMHG | OXYGEN SATURATION: 98 % | RESPIRATION RATE: 16 BRPM | WEIGHT: 223 LBS | HEART RATE: 104 BPM | TEMPERATURE: 97.2 F

## 2020-10-23 DIAGNOSIS — J03.90 TONSILLITIS: Primary | ICD-10-CM

## 2020-10-23 LAB
BASOPHILS # BLD AUTO: 0.1 10E9/L (ref 0–0.2)
BASOPHILS NFR BLD AUTO: 0.7 %
DIFFERENTIAL METHOD BLD: NORMAL
EOSINOPHIL # BLD AUTO: 0.4 10E9/L (ref 0–0.7)
EOSINOPHIL NFR BLD AUTO: 5.1 %
ERYTHROCYTE [DISTWIDTH] IN BLOOD BY AUTOMATED COUNT: 11.9 % (ref 10–15)
HCT VFR BLD AUTO: 39.8 % (ref 35–47)
HETEROPH AB SER QL: NEGATIVE
HGB BLD-MCNC: 13.4 G/DL (ref 11.7–15.7)
LYMPHOCYTES # BLD AUTO: 2.8 10E9/L (ref 0.8–5.3)
LYMPHOCYTES NFR BLD AUTO: 34.2 %
MCH RBC QN AUTO: 29.5 PG (ref 26.5–33)
MCHC RBC AUTO-ENTMCNC: 33.7 G/DL (ref 31.5–36.5)
MCV RBC AUTO: 88 FL (ref 78–100)
MONOCYTES # BLD AUTO: 0.7 10E9/L (ref 0–1.3)
MONOCYTES NFR BLD AUTO: 9.1 %
NEUTROPHILS # BLD AUTO: 4.1 10E9/L (ref 1.6–8.3)
NEUTROPHILS NFR BLD AUTO: 50.9 %
PLATELET # BLD AUTO: 273 10E9/L (ref 150–450)
RBC # BLD AUTO: 4.54 10E12/L (ref 3.8–5.2)
WBC # BLD AUTO: 8.1 10E9/L (ref 4–11)

## 2020-10-23 PROCEDURE — 86308 HETEROPHILE ANTIBODY SCREEN: CPT | Performed by: NURSE PRACTITIONER

## 2020-10-23 PROCEDURE — 85025 COMPLETE CBC W/AUTO DIFF WBC: CPT | Performed by: NURSE PRACTITIONER

## 2020-10-23 PROCEDURE — 86665 EPSTEIN-BARR CAPSID VCA: CPT | Mod: 59 | Performed by: NURSE PRACTITIONER

## 2020-10-23 PROCEDURE — 86665 EPSTEIN-BARR CAPSID VCA: CPT | Performed by: NURSE PRACTITIONER

## 2020-10-23 PROCEDURE — 36415 COLL VENOUS BLD VENIPUNCTURE: CPT | Performed by: NURSE PRACTITIONER

## 2020-10-23 PROCEDURE — 99213 OFFICE O/P EST LOW 20 MIN: CPT | Performed by: NURSE PRACTITIONER

## 2020-10-23 RX ORDER — PREDNISONE 20 MG/1
40 TABLET ORAL DAILY
Qty: 10 TABLET | Refills: 0 | Status: SHIPPED | OUTPATIENT
Start: 2020-10-23 | End: 2020-10-28

## 2020-10-23 NOTE — PATIENT INSTRUCTIONS
Labs today.  Prednisone for 5 days.    Prednisone Discharge Instructions:  Please take the steroid, Prednisone, for the full course as prescribed.  Take Prednisone with food or milk to minimize stomach upset.      Side effects of Prednisone include difficulty sleeping, increased appetite, weight gain, and changes in mood.  If you are diabetic, please monitor your blood sugar regularly while taking this medicine as Prednisone can cause high blood sugar.

## 2020-10-23 NOTE — PROGRESS NOTES
Subjective     Capri Albert is a 35 year old female who presents to clinic today for the following health issues:    HPI         Acute Illness  Acute illness concerns: Strep  Onset/Duration: Follow up  Symptoms:  Fever: YES- low grade  Chills/Sweats: YES- both  Headache (location?): YES  Sinus Pressure: no  Conjunctivitis:  no  Ear Pain: YES: right  Rhinorrhea: no  Congestion: no  Sore Throat: YES- right side  Cough: YES - when the throat is dry  Wheeze: no  Decreased Appetite: YES- somewhat  Nausea: no  Vomiting: no  Diarrhea: no  Dysuria/Freq.: no  Dysuria or Hematuria: no  Fatigue/Achiness: YES  Sick/Strep Exposure: no  Therapies tried and outcome: antibiotics    Above HPI reviewed. Additionally, treated for strep via a virtual visit on 10/2 with [facility]. Was better for a short time, then sore throat returned. On 10/12, called and got rx for Augmentin. She notes that a few days into the antibiotics, she developed a rash on her neck and chest which have now resolved. Has had intermittent fever, fatigue. No cough. Mild congestion.    Patient Active Problem List   Diagnosis     Congenital renal agenesis and dysgenesis     Cold sore     Congenital uterine anomaly     S/P laparoscopic procedure     CARDIOVASCULAR SCREENING; LDL GOAL LESS THAN 160     Encounter for post Essure sterilization check     Cervical high risk HPV (human papillomavirus) test positive     Obesity     Migraine without aura and without status migrainosus, not intractable     Current Outpatient Medications   Medication     Acetaminophen-Caffeine (EXCEDRIN TENSION HEADACHE PO)     ibuprofen 200 MG capsule     predniSONE (DELTASONE) 20 MG tablet     No current facility-administered medications for this visit.      Facility-Administered Medications Ordered in Other Visits   Medication     0.9 % sodium chloride IV solution     naloxone (NARCAN) injection 0.1-0.4 mg     sodium chloride (PF) 0.9% PF flush 3 mL     sodium chloride (PF) 0.9% PF flush 3 mL      Past Surgical History:   Procedure Laterality Date     C/SECTION, LOW TRANSVERSE      , Low Transverse      SECTION  9/10/2013    Procedure:  SECTION;;  Surgeon: Isac Palmer MD;  Location: [facility] L[facility]     FASCIOTOMY LOWER EXTREMITY Right 2016    Procedure: FASCIOTOMY LOWER EXTREMITY;  Surgeon: Joshua Arrieta DPM;  Location: [facility]     LAPAROSCOPIC CHOLECYSTECTOMY  2012    Procedure: LAPAROSCOPIC CHOLECYSTECTOMY;  Laparoscopic Cholecystectomy;  Surgeon: Kvng Borden MD;  Location: [facility]     LAPAROTOMY EXPLORATORY  2012    Procedure: LAPAROTOMY EXPLORATORY;;  Surgeon: Kvng Borden MD;  Location: [facility]     LASER HOLMIUM LITHOTRIPSY URETER(S), INSERT STENT, COMBINED Right 2019    Procedure: CYSTOURETEROSCOPY, WITH HOLMIUM LASER LITHOTRIPSY OF URETERAL CALCULUS AND STENT INSERTION;  Surgeon: Cricket Rainey MD;  Location: [facility] OR     RELEASE PLANTAR FASCIA Left 2016    Procedure: RELEASE PLANTAR FASCIA;  Surgeon: Joshua Arrieta DPM;  Location: [facility]     SURGICAL HISTORY OF -   2004    Right Knee Arthroscopy & Partial Medial and lateral Meniscectomies & Allograft reconstruction of the anterior cruciate ligament tear     SURGICAL HISTORY OF -   2000    Cystoscopy, right retrograde study     SURGICAL HISTORY OF -       Kidney Stone Surgery     SURGICAL HISTORY OF -   2000    Obstructed hemivagina with hematometra and hematocolpos & Complete uterovaginal duplication. & Excision of Left vaginal septum and drainage of hematocolpos     SURGICAL HISTORY OF -       IVP with tomograms that showed absent of left kidney,      SURGICAL HISTORY OF -       Septoplasty           Review of Systems   Constitutional, HEENT, cardiovascular, pulmonary, gi and gu systems are negative, except as otherwise noted.      Objective    There were no vitals taken for this visit.  There is no height or weight on file to calculate BMI.  Physical  Exam  Vitals signs and nursing note reviewed.   Constitutional:       Appearance: Normal appearance.   HENT:      Head: Normocephalic and atraumatic.      Right Ear: Tympanic membrane and ear canal normal.      Left Ear: Tympanic membrane and ear canal normal.      Nose: Nose normal. No rhinorrhea.      Right Sinus: No maxillary sinus tenderness or frontal sinus tenderness.      Left Sinus: No maxillary sinus tenderness or frontal sinus tenderness.      Mouth/Throat:      Lips: Pink.      Mouth: Mucous membranes are moist.      Pharynx: Oropharynx is clear. Posterior oropharyngeal erythema present. No oropharyngeal exudate.      Comments: Bilateral tonsillar enlargement, R 3+, L 2+. Uvula midline.   Eyes:      General: Lids are normal.      Conjunctiva/sclera: Conjunctivae normal.      Comments: Non icteric   Neck:      Musculoskeletal: Neck supple.   Cardiovascular:      Rate and Rhythm: Normal rate and regular rhythm.      Pulses: Normal pulses.      Heart sounds: Normal heart sounds, S1 normal and S2 normal.   Pulmonary:      Effort: Pulmonary effort is normal.      Breath sounds: Normal breath sounds and air entry.   Lymphadenopathy:      Cervical: Cervical adenopathy present.      Right cervical: Superficial cervical adenopathy present.      Left cervical: Superficial cervical adenopathy present.   Skin:     General: Skin is warm and dry.   Neurological:      General: No focal deficit present.      Mental Status: She is alert and oriented to person, place, and time.   Psychiatric:         Mood and Affect: Mood normal.         Behavior: Behavior normal.         Thought Content: Thought content normal.         Judgment: Judgment normal.                    Assessment & Plan     Tonsillitis  Suspect this is mono given no improvement and rash on abx. Nothing to suggest PTA. labs today. Prednisone for tonsillar swelling.  - Mononucleosis screen  - CBC with platelets differential  - EBV Capsid Antibody IgM  - EBV  Capsid Antibody IgG  - predniSONE (DELTASONE) 20 MG tablet; Take 2 tablets (40 mg) by mouth daily for 5 days        Patient Instructions   Labs today.  Prednisone for 5 days.    Prednisone Discharge Instructions:  Please take the steroid, Prednisone, for the full course as prescribed.  Take Prednisone with food or milk to minimize stomach upset.      Side effects of Prednisone include difficulty sleeping, increased appetite, weight gain, and changes in mood.  If you are diabetic, please monitor your blood sugar regularly while taking this medicine as Prednisone can cause high blood sugar.        Return in about 1 week (around 10/30/2020) for worsening or continued symptoms.    [facility] [employer]

## 2020-10-26 LAB
EBV VCA IGG SER QL IA: >8 AI (ref 0–0.8)
EBV VCA IGM SER QL IA: <0.2 AI (ref 0–0.8)

## 2020-11-02 ENCOUNTER — TELEPHONE (OUTPATIENT)
Dept: OBGYN | Facility: CLINIC | Age: 35
End: 2020-11-02

## 2020-11-02 NOTE — TELEPHONE ENCOUNTER
"S-(situation): Pt reports that she developed a \"marble size lump on the right side of her labia that is very painful\".  This developed about 2 days ago and it is gradually getting bigger and more painful.    B-(background): Last ov with ob/gyn 12-08-14.    A-(assessment): Right labial cyst.    R-(recommendations): Pt was advised to be seen in FP for further evaluation.  If no available appts and pain progressively worsens she was advised to be seen in uc/er.    Pt agrees with this plan.    Shanelle Miguel  [facility] RN    "

## 2020-11-02 NOTE — TELEPHONE ENCOUNTER
Reason for Call:  Other lump    Detailed comments: Pt states she has a lump on outside of her vagina, very painful, would like to be seen, please call     Phone Number Patient can be reached at: Home number on file [phone] (home)    Best Time: today    Can we leave a detailed message on this number? YES    Call taken on 11/2/2020 at 12:14 PM by Chelo Marcos

## 2020-11-16 ENCOUNTER — HEALTH MAINTENANCE LETTER (OUTPATIENT)
Age: 35
End: 2020-11-16

## 2020-12-21 NOTE — TELEPHONE ENCOUNTER
Annual scheduled 12/30   DAYSI Rivas    Reviewed case with Dr Will.  VORB Dr Will;  Due to only 1 good kidney and risk to that kidney if stones do obstruct,  Should be seen by Dr Rainey at [place].    Staff Message sent to Dr Rainey to review the case.  Pt advised to continue to follow with Dr Rivas regarding care in the meantime until an answer from [place].    Jimena Allen RN  [facility]

## 2021-03-21 ENCOUNTER — APPOINTMENT (OUTPATIENT)
Dept: ULTRASOUND IMAGING | Facility: CLINIC | Age: 36
End: 2021-03-21
Attending: PHYSICIAN ASSISTANT
Payer: COMMERCIAL

## 2021-03-21 ENCOUNTER — HOSPITAL ENCOUNTER (EMERGENCY)
Facility: CLINIC | Age: 36
Discharge: HOME | End: 2021-03-21
Attending: PHYSICIAN ASSISTANT | Admitting: PHYSICIAN ASSISTANT
Payer: COMMERCIAL

## 2021-03-21 VITALS
HEART RATE: 94 BPM | TEMPERATURE: 99 F | OXYGEN SATURATION: 97 % | DIASTOLIC BLOOD PRESSURE: 84 MMHG | WEIGHT: 220 LBS | RESPIRATION RATE: 16 BRPM | SYSTOLIC BLOOD PRESSURE: 139 MMHG | BODY MASS INDEX: 34.46 KG/M2

## 2021-03-21 DIAGNOSIS — R10.11 ABDOMINAL PAIN, RIGHT UPPER QUADRANT: ICD-10-CM

## 2021-03-21 LAB
ALBUMIN SERPL-MCNC: 4.3 G/DL (ref 3.4–5)
ALBUMIN UR-MCNC: 30 MG/DL
ALP SERPL-CCNC: 86 U/L (ref 40–150)
ALT SERPL W P-5'-P-CCNC: 29 U/L (ref 0–50)
ANION GAP SERPL CALCULATED.3IONS-SCNC: 8 MMOL/L (ref 3–14)
APPEARANCE UR: CLEAR
AST SERPL W P-5'-P-CCNC: 14 U/L (ref 0–45)
BACTERIA #/AREA URNS HPF: ABNORMAL /HPF
BASOPHILS # BLD AUTO: 0.1 10E9/L (ref 0–0.2)
BASOPHILS NFR BLD AUTO: 0.5 %
BILIRUB SERPL-MCNC: 0.6 MG/DL (ref 0.2–1.3)
BILIRUB UR QL STRIP: NEGATIVE
BUN SERPL-MCNC: 13 MG/DL (ref 7–30)
CALCIUM SERPL-MCNC: 9 MG/DL (ref 8.5–10.1)
CHLORIDE SERPL-SCNC: 104 MMOL/L (ref 94–109)
CO2 SERPL-SCNC: 27 MMOL/L (ref 20–32)
COLOR UR AUTO: YELLOW
CREAT SERPL-MCNC: 0.8 MG/DL (ref 0.52–1.04)
DIFFERENTIAL METHOD BLD: ABNORMAL
EOSINOPHIL # BLD AUTO: 0.4 10E9/L (ref 0–0.7)
EOSINOPHIL NFR BLD AUTO: 2.8 %
ERYTHROCYTE [DISTWIDTH] IN BLOOD BY AUTOMATED COUNT: 12.4 % (ref 10–15)
GFR SERPL CREATININE-BSD FRML MDRD: >90 ML/MIN/{1.73_M2}
GLUCOSE SERPL-MCNC: 133 MG/DL (ref 70–99)
GLUCOSE UR STRIP-MCNC: NEGATIVE MG/DL
HCG UR QL: NEGATIVE
HCT VFR BLD AUTO: 44.7 % (ref 35–47)
HGB BLD-MCNC: 15.2 G/DL (ref 11.7–15.7)
HGB UR QL STRIP: NEGATIVE
IMM GRANULOCYTES # BLD: 0 10E9/L (ref 0–0.4)
IMM GRANULOCYTES NFR BLD: 0.3 %
KETONES UR STRIP-MCNC: 5 MG/DL
LEUKOCYTE ESTERASE UR QL STRIP: NEGATIVE
LIPASE SERPL-CCNC: 103 U/L (ref 73–393)
LYMPHOCYTES # BLD AUTO: 2.6 10E9/L (ref 0.8–5.3)
LYMPHOCYTES NFR BLD AUTO: 21.2 %
MCH RBC QN AUTO: 29.8 PG (ref 26.5–33)
MCHC RBC AUTO-ENTMCNC: 34 G/DL (ref 31.5–36.5)
MCV RBC AUTO: 88 FL (ref 78–100)
MONOCYTES # BLD AUTO: 1 10E9/L (ref 0–1.3)
MONOCYTES NFR BLD AUTO: 8.2 %
MUCOUS THREADS #/AREA URNS LPF: PRESENT /LPF
NEUTROPHILS # BLD AUTO: 8.3 10E9/L (ref 1.6–8.3)
NEUTROPHILS NFR BLD AUTO: 67 %
NITRATE UR QL: NEGATIVE
NRBC # BLD AUTO: 0 10*3/UL
NRBC BLD AUTO-RTO: 0 /100
PH UR STRIP: 7 PH (ref 5–7)
PLATELET # BLD AUTO: 295 10E9/L (ref 150–450)
POTASSIUM SERPL-SCNC: 3.2 MMOL/L (ref 3.4–5.3)
PROT SERPL-MCNC: 8.7 G/DL (ref 6.8–8.8)
RBC # BLD AUTO: 5.1 10E12/L (ref 3.8–5.2)
RBC #/AREA URNS AUTO: 1 /HPF (ref 0–2)
SODIUM SERPL-SCNC: 139 MMOL/L (ref 133–144)
SOURCE: ABNORMAL
SP GR UR STRIP: 1.02 (ref 1–1.03)
SQUAMOUS #/AREA URNS AUTO: 3 /HPF (ref 0–1)
UROBILINOGEN UR STRIP-MCNC: 0 MG/DL (ref 0–2)
WBC # BLD AUTO: 12.4 10E9/L (ref 4–11)
WBC #/AREA URNS AUTO: <1 /HPF (ref 0–5)

## 2021-03-21 PROCEDURE — 99282 EMERGENCY DEPT VISIT SF MDM: CPT | Performed by: PHYSICIAN ASSISTANT

## 2021-03-21 PROCEDURE — 96361 HYDRATE IV INFUSION ADD-ON: CPT | Performed by: PHYSICIAN ASSISTANT

## 2021-03-21 PROCEDURE — 85025 COMPLETE CBC W/AUTO DIFF WBC: CPT | Performed by: PHYSICIAN ASSISTANT

## 2021-03-21 PROCEDURE — 81025 URINE PREGNANCY TEST: CPT | Performed by: PHYSICIAN ASSISTANT

## 2021-03-21 PROCEDURE — 83690 ASSAY OF LIPASE: CPT | Performed by: PHYSICIAN ASSISTANT

## 2021-03-21 PROCEDURE — 76705 ECHO EXAM OF ABDOMEN: CPT

## 2021-03-21 PROCEDURE — 81001 URINALYSIS AUTO W/SCOPE: CPT | Performed by: PHYSICIAN ASSISTANT

## 2021-03-21 PROCEDURE — 258N000003 HC RX IP 258 OP 636: Performed by: PHYSICIAN ASSISTANT

## 2021-03-21 PROCEDURE — 80053 COMPREHEN METABOLIC PANEL: CPT | Performed by: PHYSICIAN ASSISTANT

## 2021-03-21 PROCEDURE — 87086 URINE CULTURE/COLONY COUNT: CPT | Performed by: PHYSICIAN ASSISTANT

## 2021-03-21 PROCEDURE — 99284 EMERGENCY DEPT VISIT MOD MDM: CPT | Mod: 25 | Performed by: PHYSICIAN ASSISTANT

## 2021-03-21 PROCEDURE — 96360 HYDRATION IV INFUSION INIT: CPT | Performed by: PHYSICIAN ASSISTANT

## 2021-03-21 RX ADMIN — SODIUM CHLORIDE 1000 ML: 9 INJECTION, SOLUTION INTRAVENOUS at 16:38

## 2021-03-21 ASSESSMENT — ENCOUNTER SYMPTOMS
DYSURIA: 0
VOMITING: 0
CARDIOVASCULAR NEGATIVE: 1
FREQUENCY: 0
NEUROLOGICAL NEGATIVE: 1
MUSCULOSKELETAL NEGATIVE: 1
FEVER: 0
EYES NEGATIVE: 1
CONSTITUTIONAL NEGATIVE: 1
FLANK PAIN: 0
NAUSEA: 1
CONSTIPATION: 0
RESPIRATORY NEGATIVE: 1
DIARRHEA: 0
PSYCHIATRIC NEGATIVE: 1
ABDOMINAL PAIN: 1

## 2021-03-21 NOTE — ED TRIAGE NOTES
"Pt c/o right abd pain starting a week ago, started as dull, now \"fire\". No nausea or vomiting. Pt took advil with no relief, last took 1000.  "

## 2021-03-22 LAB
BACTERIA SPEC CULT: NORMAL
Lab: NORMAL
SPECIMEN SOURCE: NORMAL

## 2021-03-22 NOTE — DISCHARGE INSTRUCTIONS
Increase fluids, rest, Tylenol and ibuprofen over-the-counter as needed for pain.    We will send urine culture.    Follow-up with primary care doctor for recheck in few days.  Return the emergency department symptoms worsen or changes includes fevers, urinary symptoms, back pain, worsening abdominal pain, or change in symptoms.

## 2021-03-22 NOTE — ED PROVIDER NOTES
History     Chief Complaint   Patient presents with     Abdominal Pain     HPI  Capri Albert is a 35 year old female who presents to the Emergency Room today with right sided abdominal pain that has been on and off for the past week. Patient states some nausea. Patient denies fevers, flank or back pain, headache, dizziness, vomiting, diarrhea, bloody or black tarry stools, abdominal distention, shortness of breath, chest pain, heart racing or skipping beats. Patient denies any COVID-19 exposure concerns. Patient states that she had her gallbladder removed years ago. Patient also states that she only has 1 kidney which is on the right side. Patient does have a history of kidney stones and is worried that this may be the cause of her symptoms. She denies any history of ovarian cysts, pelvic pain, or urinary or vaginal symptoms today.    Allergies:  No Known Allergies    Problem List:    Patient Active Problem List    Diagnosis Date Noted     Migraine without aura and without status migrainosus, not intractable 03/07/2016     Priority: Medium     Obesity 01/14/2015     Priority: Medium     Cervical high risk HPV (human papillomavirus) test positive 12/14/2014     Priority: Medium     12/8/14:Pap--NIL, +High Risk HPV (NOT type 16 or 18). Plan to repeat pap + HPV cotesting in 1 year. Reminder placed in TRACKING  05/15/17 Would consider patient to be lost to follow-up.       Encounter for post Essure sterilization check 12/08/2014     Priority: Medium     CARDIOVASCULAR SCREENING; LDL GOAL LESS THAN 160 12/04/2012     Priority: Medium     S/P laparoscopic procedure 07/24/2012     Priority: Medium     Congenital uterine anomaly 03/10/2011     Priority: Medium     Cold sore 07/29/2009     Priority: Medium     Congenital renal agenesis and dysgenesis 07/09/2007     Priority: Medium     Absent left kidney; baseline preeclampsia labs with 24 hr U Protein; check Urine culture every trimester    Prior pregnancy complicated  by preeclampsia--delivered at 37+weeks          Past Medical History:    Past Medical History:   Diagnosis Date     Cervical high risk HPV (human papillomavirus) test positive 2014     Chickenpox      MEDICAL HISTORY OF -      MEDICAL HISTORY OF -      MEDICAL HISTORY OF -      Other specified congenital anomaly of kidney      PONV (postoperative nausea and vomiting)      Preeclampsia        Past Surgical History:    Past Surgical History:   Procedure Laterality Date     C/SECTION, LOW TRANSVERSE      , Low Transverse      SECTION  9/10/2013    Procedure:  SECTION;;  Surgeon: Isac Palmer MD;  [facility]     FASCIOTOMY LOWER EXTREMITY Right 2016    Procedure: FASCIOTOMY LOWER EXTREMITY;  Surgeon: Joshua Arrieta DPM;  Location: [facility]     LAPAROSCOPIC CHOLECYSTECTOMY  2012    Procedure: LAPAROSCOPIC CHOLECYSTECTOMY;  Laparoscopic Cholecystectomy;  Surgeon: Kvng Borden MD;  Location: [facility]     LAPAROTOMY EXPLORATORY  2012    Procedure: LAPAROTOMY EXPLORATORY;;  Surgeon: Kvng Borden MD;  Location: [facility]     LASER HOLMIUM LITHOTRIPSY URETER(S), INSERT STENT, COMBINED Right 2019    Procedure: CYSTOURETEROSCOPY, WITH HOLMIUM LASER LITHOTRIPSY OF URETERAL CALCULUS AND STENT INSERTION;  Surgeon: Cricket Rainey MD;  [facility]     RELEASE PLANTAR FASCIA Left 2016    Procedure: RELEASE PLANTAR FASCIA;  Surgeon: Joshua Arrieta DPM;  Location: [facility]     SURGICAL HISTORY OF -   2004    Right Knee Arthroscopy & Partial Medial and lateral Meniscectomies & Allograft reconstruction of the anterior cruciate ligament tear     SURGICAL HISTORY OF -   2000    Cystoscopy, right retrograde study     SURGICAL HISTORY OF -       Kidney Stone Surgery     SURGICAL HISTORY OF -   2000    Obstructed hemivagina with hematometra and hematocolpos & Complete uterovaginal duplication. & Excision of Left vaginal septum and  drainage of hematocolpos     SURGICAL HISTORY OF -       IVP with tomograms that showed absent of left kidney,      SURGICAL HISTORY OF -       Septoplasty       Family History:    Family History   Problem Relation Age of Onset     Hypertension Mother      Alcohol/Drug Father         recovered alcohol     Cardiovascular Paternal Grandfather 30        MI     Cardiovascular Paternal Grandmother 60        MI     Cardiovascular Maternal Grandfather 60        MI       Social History:  Marital Status:  Single [1]  Social History     Tobacco Use     Smoking status: Never Smoker     Smokeless tobacco: Never Used   Substance Use Topics     Alcohol use: Yes     Comment: occas-      Drug use: No        Medications:    Acetaminophen-Caffeine (EXCEDRIN TENSION HEADACHE PO)  ibuprofen 200 MG capsule          Review of Systems   Constitutional: Negative.  Negative for fever.   HENT: Negative.    Eyes: Negative.    Respiratory: Negative.    Cardiovascular: Negative.    Gastrointestinal: Positive for abdominal pain and nausea. Negative for constipation, diarrhea and vomiting.   Genitourinary: Negative.  Negative for dysuria, flank pain, frequency and urgency.   Musculoskeletal: Negative.    Skin: Negative.    Neurological: Negative.    Psychiatric/Behavioral: Negative.    All other systems reviewed and are negative.      Physical Exam   BP: (!) 168/98  Pulse: 135  Temp: 98.7  F (37.1  C)  Resp: 16  Weight: 99.8 kg (220 lb)  SpO2: 99 %      Physical Exam  Vitals signs and nursing note reviewed.   Constitutional:       General: She is not in acute distress.     Appearance: She is well-developed and normal weight. She is not ill-appearing, toxic-appearing or diaphoretic.   HENT:      Head: Normocephalic and atraumatic.      Mouth/Throat:      Mouth: Mucous membranes are moist.      Pharynx: Oropharynx is clear. No pharyngeal swelling or oropharyngeal exudate.   Eyes:      General: No scleral icterus.     Extraocular Movements:  Extraocular movements intact.      Pupils: Pupils are equal, round, and reactive to light.   Cardiovascular:      Rate and Rhythm: Normal rate and regular rhythm.      Heart sounds: Normal heart sounds. No murmur.   Pulmonary:      Effort: Pulmonary effort is normal.      Breath sounds: Normal breath sounds.   Abdominal:      General: Abdomen is protuberant. Bowel sounds are normal. There is no distension or abdominal bruit.      Palpations: Abdomen is soft.      Tenderness: There is no abdominal tenderness. There is no right CVA tenderness, left CVA tenderness, guarding or rebound.      Hernia: No hernia is present.   Skin:     General: Skin is warm.      Capillary Refill: Capillary refill takes less than 2 seconds.      Findings: No erythema or rash.   Neurological:      General: No focal deficit present.      Mental Status: She is alert and oriented to person, place, and time.   Psychiatric:         Mood and Affect: Mood normal.         Behavior: Behavior normal.         ED Course        Procedures              Critical Care time:  none               Results for orders placed or performed during the hospital encounter of 03/21/21 (from the past 24 hour(s))   UA reflex to Microscopic   Result Value Ref Range    Color Urine Yellow     Appearance Urine Clear     Glucose Urine Negative NEG^Negative mg/dL    Bilirubin Urine Negative NEG^Negative    Ketones Urine 5 (A) NEG^Negative mg/dL    Specific Gravity Urine 1.019 1.003 - 1.035    Blood Urine Negative NEG^Negative    pH Urine 7.0 5.0 - 7.0 pH    Protein Albumin Urine 30 (A) NEG^Negative mg/dL    Urobilinogen mg/dL 0.0 0.0 - 2.0 mg/dL    Nitrite Urine Negative NEG^Negative    Leukocyte Esterase Urine Negative NEG^Negative    Source Midstream Urine     RBC Urine 1 0 - 2 /HPF    WBC Urine <1 0 - 5 /HPF    Bacteria Urine Few (A) NEG^Negative /HPF    Squamous Epithelial /HPF Urine 3 (H) 0 - 1 /HPF    Mucous Urine Present (A) NEG^Negative /LPF   HCG qualitative urine  (UPT)   Result Value Ref Range    HCG Qual Urine Negative NEG^Negative   Urine Culture    Specimen: Urine clean catch; Midstream Urine   Result Value Ref Range    Specimen Description Midstream Urine     Special Requests Specimen received in preservative     Culture Micro PENDING    CBC with platelets, differential   Result Value Ref Range    WBC 12.4 (H) 4.0 - 11.0 10e9/L    RBC Count 5.10 3.8 - 5.2 10e12/L    Hemoglobin 15.2 11.7 - 15.7 g/dL    Hematocrit 44.7 35.0 - 47.0 %    MCV 88 78 - 100 fl    MCH 29.8 26.5 - 33.0 pg    MCHC 34.0 31.5 - 36.5 g/dL    RDW 12.4 10.0 - 15.0 %    Platelet Count 295 150 - 450 10e9/L    Diff Method Automated Method     % Neutrophils 67.0 %    % Lymphocytes 21.2 %    % Monocytes 8.2 %    % Eosinophils 2.8 %    % Basophils 0.5 %    % Immature Granulocytes 0.3 %    Nucleated RBCs 0 0 /100    Absolute Neutrophil 8.3 1.6 - 8.3 10e9/L    Absolute Lymphocytes 2.6 0.8 - 5.3 10e9/L    Absolute Monocytes 1.0 0.0 - 1.3 10e9/L    Absolute Eosinophils 0.4 0.0 - 0.7 10e9/L    Absolute Basophils 0.1 0.0 - 0.2 10e9/L    Abs Immature Granulocytes 0.0 0 - 0.4 10e9/L    Absolute Nucleated RBC 0.0    Comprehensive metabolic panel   Result Value Ref Range    Sodium 139 133 - 144 mmol/L    Potassium 3.2 (L) 3.4 - 5.3 mmol/L    Chloride 104 94 - 109 mmol/L    Carbon Dioxide 27 20 - 32 mmol/L    Anion Gap 8 3 - 14 mmol/L    Glucose 133 (H) 70 - 99 mg/dL    Urea Nitrogen 13 7 - 30 mg/dL    Creatinine 0.80 0.52 - 1.04 mg/dL    GFR Estimate >90 >60 mL/min/[1.73_m2]    GFR Estimate If Black >90 >60 mL/min/[1.73_m2]    Calcium 9.0 8.5 - 10.1 mg/dL    Bilirubin Total 0.6 0.2 - 1.3 mg/dL    Albumin 4.3 3.4 - 5.0 g/dL    Protein Total 8.7 6.8 - 8.8 g/dL    Alkaline Phosphatase 86 40 - 150 U/L    ALT 29 0 - 50 U/L    AST 14 0 - 45 U/L   Lipase   Result Value Ref Range    Lipase 103 73 - 393 U/L   US Abdomen Limited (RUQ)    Narrative    US ABDOMEN LIMITED 3/21/2021 6:25 PM    CLINICAL HISTORY: Right upper abdominal  pain for 1 week with elevated  WBC. Patient with history of cholecystectomy.    TECHNIQUE: Limited abdominal ultrasound.    COMPARISON: None.    FINDINGS:    GALLBLADDER: Surgically absent.    BILE DUCTS: There is no biliary dilatation. The common duct measures 6  mm.    LIVER: Unremarkable where seen.    RIGHT KIDNEY: No hydronephrosis.    PANCREAS: The visualized portions of the pancreas are normal.    No ascites.      Impression    IMPRESSION:  Negative limited abdominal ultrasound.    JOSE ZAMORA MD       Medications   0.9% sodium chloride BOLUS (0 mLs Intravenous Stopped 3/21/21 1910)       Assessments & Plan (with Medical Decision Making)     I have reviewed the nursing notes.    I have reviewed the findings, diagnosis, plan and need for follow up with the patient.    Capri Albert is a 35 year old female who presents to the Emergency Room today with right sided abdominal pain that has been on and off for the past week. Patient states some nausea. Patient denies fevers, flank or back pain, headache, dizziness, vomiting, diarrhea, bloody or black tarry stools, abdominal distention, shortness of breath, chest pain, heart racing or skipping beats. Patient denies any COVID-19 exposure concerns. Patient states that she had her gallbladder removed years ago. Patient also states that she only has 1 kidney which is on the right side. Patient does have a history of kidney stones and is worried that this may be the cause of her symptoms. She denies any history of ovarian cysts, pelvic pain, or urinary or vaginal symptoms today.    See exam findings above. Exam unremarkable for acute abdomen or worrisome findings. CBC, comprehensive metabolic panel, lipase obtained with no significant abnormalities other than potassium slightly low at 3.2 and WBC count elevated at 12.4 with no neutrophil shift. Urine sent with few bacteria 5 ketones, 30 protein noted. Urine culture sent and currently pending however I have low  suspicion for acute cystitis, kidney infection at this time due to exam and patient not having any urinary symptoms. Due to the benign exam and no significant abnormal findings other than slightly elevated white count ultrasound of the right upper quadrant obtained which was negative. Patient informed of these findings that there is no hydronephrosis, acute abdomen, or biliary duct sludge. Patient informed to increase fluids, rest, Tylenol over-the-counter as needed for pain and to follow-up with primary care doctor for recheck in a few days. Patient return sooner symptoms worsen or change these were discussed with patient given on discharge paperwork. Patient discharged in stable condition. Vitals all within normal limits.    Discharge Medication List as of 3/21/2021  7:15 PM          Final diagnoses:   Abdominal pain, right upper quadrant       3/21/2021   [facility] [place] EMERGENCY DEPT     Rosa Hess PA-C  03/21/21 4488

## 2021-03-23 NOTE — RESULT ENCOUNTER NOTE
Final urine culture report is NEGATIVE  No change in treatment per [facility] ED Lab Result Urine Culture protocol.

## 2021-06-02 ENCOUNTER — OFFICE VISIT (OUTPATIENT)
Dept: DERMATOLOGY | Facility: CLINIC | Age: 36
End: 2021-06-02
Payer: COMMERCIAL

## 2021-06-02 ENCOUNTER — TELEPHONE (OUTPATIENT)
Dept: DERMATOLOGY | Facility: CLINIC | Age: 36
End: 2021-06-02

## 2021-06-02 VITALS — DIASTOLIC BLOOD PRESSURE: 92 MMHG | OXYGEN SATURATION: 97 % | SYSTOLIC BLOOD PRESSURE: 135 MMHG | HEART RATE: 105 BPM

## 2021-06-02 DIAGNOSIS — D22.9 DERMAL AND EPIDERMAL NEVUS: ICD-10-CM

## 2021-06-02 DIAGNOSIS — L73.8 SENILE SEBACEOUS GLAND HYPERPLASIA: ICD-10-CM

## 2021-06-02 DIAGNOSIS — L82.1 SEBORRHEIC KERATOSIS: Primary | ICD-10-CM

## 2021-06-02 DIAGNOSIS — L70.0 ACNE VULGARIS: ICD-10-CM

## 2021-06-02 DIAGNOSIS — D22.9 NEVUS: ICD-10-CM

## 2021-06-02 DIAGNOSIS — L81.4 LENTIGO: ICD-10-CM

## 2021-06-02 DIAGNOSIS — B00.1 COLD SORE: Primary | ICD-10-CM

## 2021-06-02 DIAGNOSIS — D18.01 ANGIOMA OF SKIN: ICD-10-CM

## 2021-06-02 PROCEDURE — 99204 OFFICE O/P NEW MOD 45 MIN: CPT | Performed by: DERMATOLOGY

## 2021-06-02 RX ORDER — TRETINOIN 1 MG/G
CREAM TOPICAL AT BEDTIME
Qty: 45 G | Refills: 11 | Status: SHIPPED | OUTPATIENT
Start: 2021-06-02

## 2021-06-02 RX ORDER — SPIRONOLACTONE 25 MG/1
25 TABLET ORAL 2 TIMES DAILY
Qty: 60 TABLET | Refills: 3 | Status: SHIPPED | OUTPATIENT
Start: 2021-06-02

## 2021-06-02 NOTE — LETTER
2021         RE: Capri Albert  [id number] [address] [place] [address]        Dear Colleague,    Thank you for referring your patient, Capri Albert, to the [facility]. Please see a copy of my visit note below.    Capri Albert is an extremely pleasant 35 year old year old female patient here today for spot on left hip, acne on face, spots on face, moles on back and spot on nose.   .   Patient states this has been present for a while.  Patient reports the following symptoms:  acne.  Patient reports the following previous treatments OTC.  These treatments did not work.  Patient reports the following modifying factors no variation with menses.  She has had tubes tied.  .  Associated symptoms: none.  Patient has no other skin complaints today.  Remainder of the HPI, Meds, PMH, Allergies, FH, and SH was reviewed in chart.      Past Medical History:   Diagnosis Date     Cervical high risk HPV (human papillomavirus) test positive 2014:Pap--NIL, +High Risk HPV (NOT type 16 or 18). Plan to repeat pap + HPV cotesting in 1 year. 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     PONV (postoperative nausea and vomiting)      Preeclampsia        Past Surgical History:   Procedure Laterality Date     C/SECTION, LOW TRANSVERSE      , Low Transverse      SECTION  9/10/2013    Procedure:  SECTION;;  Surgeon: Isac Palmer MD;  [facility]     FASCIOTOMY LOWER EXTREMITY Right 2016    Procedure: FASCIOTOMY LOWER EXTREMITY;  Surgeon: Joshua Arrieta DPM;  Location: [facility]     LAPAROSCOPIC CHOLECYSTECTOMY  2012    Procedure: LAPAROSCOPIC CHOLECYSTECTOMY;  Laparoscopic  Cholecystectomy;  Surgeon: Kvng Borden MD;  Location: [facility]     LAPAROTOMY EXPLORATORY  7/24/2012    Procedure: LAPAROTOMY EXPLORATORY;;  Surgeon: Kvng Borden MD;  Location: [facility]     LASER HOLMIUM LITHOTRIPSY URETER(S), INSERT STENT, COMBINED Right 4/25/2019    Procedure: CYSTOURETEROSCOPY, WITH HOLMIUM LASER LITHOTRIPSY OF URETERAL CALCULUS AND STENT INSERTION;  Surgeon: Cricket Rainey MD;  [facility]     RELEASE PLANTAR FASCIA Left 9/20/2016    Procedure: RELEASE PLANTAR FASCIA;  Surgeon: Joshua Arrieta DPM;  Location: [facility]     SURGICAL HISTORY OF -   8/19/2004    Right Knee Arthroscopy & Partial Medial and lateral Meniscectomies & Allograft reconstruction of the anterior cruciate ligament tear     SURGICAL HISTORY OF -   11/8/2000    Cystoscopy, right retrograde study     SURGICAL HISTORY OF -   2000    Kidney Stone Surgery     SURGICAL HISTORY OF -   7/2000    Obstructed hemivagina with hematometra and hematocolpos & Complete uterovaginal duplication. & Excision of Left vaginal septum and drainage of hematocolpos     SURGICAL HISTORY OF -       IVP with tomograms that showed absent of left kidney,      SURGICAL HISTORY OF -       Septoplasty        Family History   Problem Relation Age of Onset     Hypertension Mother      Alcohol/Drug Father         recovered alcohol     Cardiovascular Paternal Grandfather 30        MI     Cardiovascular Paternal Grandmother 60        MI     Cardiovascular Maternal Grandfather 60        MI       Social History     Socioeconomic History     Marital status: Single     Spouse name: Caleb     Number of children: 4     Years of education: Not on file     Highest education level: Not on file   Occupational History     Employer: UNEMPLOYED     Employer: [employer]   Social Needs     Financial resource strain: Not on file     Food insecurity     Worry: Not on file     Inability: Not on file     Transportation needs     Medical: Not on file     Non-medical: Not  on file   Tobacco Use     Smoking status: Never Smoker     Smokeless tobacco: Never Used   Substance and Sexual Activity     Alcohol use: Yes     Comment: occas-      Drug use: No     Sexual activity: Not Currently     Partners: Male   Lifestyle     Physical activity     Days per week: Not on file     Minutes per session: Not on file     Stress: Not on file   Relationships     Social connections     Talks on phone: Not on file     Gets together: Not on file     Attends Temple service: Not on file     Active member of club or organization: Not on file     Attends meetings of clubs or organizations: Not on file     Relationship status: Not on file     Intimate partner violence     Fear of current or ex partner: Not on file     Emotionally abused: Not on file     Physically abused: Not on file     Forced sexual activity: Not on file   Other Topics Concern     Parent/sibling w/ CABG, MI or angioplasty before 65F 55M? No   Social History Narrative     Not on file       Outpatient Encounter Medications as of 6/2/2021   Medication Sig Dispense Refill     Acetaminophen-Caffeine (EXCEDRIN TENSION HEADACHE PO) Take by mouth daily as needed       ibuprofen 200 MG capsule 4 tablets  capsule      Facility-Administered Encounter Medications as of 6/2/2021   Medication Dose Route Frequency Provider Last Rate Last Admin     0.9 % sodium chloride IV solution  1,000 mL Intravenous Continuous Kvng Borden MD         naloxone (NARCAN) injection 0.1-0.4 mg  0.1-0.4 mg Intravenous Q2 Min PRN Kvng Borden MD         sodium chloride (PF) 0.9% PF flush 3 mL  3 mL Intravenous Q1H PRN Kvng Borden MD         sodium chloride (PF) 0.9% PF flush 3 mL  3 mL Intravenous Q8H Kvng Borden MD   3 mL at 07/30/12 0639             O:   NAD, WDWN, Alert & Oriented, Mood & Affect wnl, Vitals stable   Here today alone   BP (!) 135/92   Pulse 105   SpO2 97%    General appearance normal   Vitals stable   Alert, oriented and in no acute  distress      Following lymph nodes palpated: Occipital, Cervical, Supraclavicular no lad   L hip stuck on papule   Yellow papules on face   Inflammatory papules on jawline and chin  Flesh colored papules on face  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    Rare pigmented macules <1mm on trunk and legs with regular borders and pigment networks  Stuck on papules and brown macules on trunk and ext   Red papules on trunk  Flesh colored papules on trunk     The remainder of the full exam was normal; the following areas were examined:  conjunctiva/lids, oral mucosa, neck, peripheral vascular system, abdomen, lymph nodes, digits/nails, eccrine and apocrine glands, scalp/hair, face, neck, chest, abdomen, buttocks, back, RUE, LUE, RLE, LLE       Eyes: Conjunctivae/lids:Normal     ENT: Lips, buccal mucosa, tongue: normal    MSK:Normal    Cardiovascular: peripheral edema none    Pulm: Breathing Normal    Lymph Nodes: No Head and Neck Lymphadenopathy     Neuro/Psych: Orientation:Alert and Orientedx3 ; Mood/Affect:normal       A/P:  1. Seborrheic keratosis, lentigo, angioma, dermal nevus, nevi , sebaceous hyperplasia   Cosmetic lasers discussed with patient   2. Adult female acne  Pathophysiology discussed with pateint   Acne vulgaris    Pathophysiology discussed with pateint and information provided   I discussed with patient Oral Abx, Aldactone, Topical creams, light therapies and OCT  Treating acne is preventative    Acne can be effectively treated, although response may sometimes be slow.   Where possible, avoid excessively humid conditions such as a sauna, working in an unventilated kitchen or tropical vacations.   If you smoke, stop. Nicotine increases sebum retention and increased scale within the follicles, forming comedones (black and whiteheads).    Minimize the application of oils and cosmetics to the affected skin.   Abrasive skin treatments can aggravate acne.   Try not to scratch or pick the spots.   To avoid sunburn, protect your  skin outdoors using a sunscreen and protective clothing.  No relationship between particular foods and acne has been proven. However, reports suggest low glycemic and low dairy diet are helpful for some people.    May take 3-4 months to see 50% improvement  May get worse during initial phase of treatment  Tretinoin at bedtime, dryness, irritation and way to prevent discussed with patient   BPO wash daily or every other day depending on dryness  Aggressive use of bland emollients discussed with patient   Aldactone daily GI upset, esophagitis and UV precautions discussed with patient     It was a pleasure speaking to Capri Albert today.  Previous clinic notes and pertinent laboratory tests were reviewed prior to Capri Albert's visit.  BENIGN LESIONS DISCUSSED WITH PATIENT:  I discussed the specifics of tumor, prognosis, and genetics of benign lesions.  I explained that treatment of these lesions would be purely cosmetic and not medically neccessary.  I discussed with patient different removal options including excision, cautery and /or laser.      Nature of benign skin lesions dicussed with patient.  Signs and Symptoms of skin cancer discussed with patient.  Patient encouraged to perform monthly skin exams.  UV precautions reviewed with patient.  Capri to follow up with Primary Care provider regarding elevated blood pressure.  Return to clinic 3 months        Again, thank you for allowing me to participate in the care of your patient.        Sincerely,        Cricket Goetz MD

## 2021-06-02 NOTE — PROGRESS NOTES
Capri Albert is an extremely pleasant 35 year old year old female patient here today for spot on left hip, acne on face, spots on face, moles on back and spot on nose.   .   Patient states this has been present for a while.  Patient reports the following symptoms:  acne.  Patient reports the following previous treatments OTC.  These treatments did not work.  Patient reports the following modifying factors no variation with menses.  She has had tubes tied.  .  Associated symptoms: none.  Patient has no other skin complaints today.  Remainder of the HPI, Meds, PMH, Allergies, FH, and SH was reviewed in chart.      Past Medical History:   Diagnosis Date     Cervical high risk HPV (human papillomavirus) test positive 2014:Pap--NIL, +High Risk HPV (NOT type 16 or 18). Plan to repeat pap + HPV cotesting in 1 year. 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     PONV (postoperative nausea and vomiting)      Preeclampsia        Past Surgical History:   Procedure Laterality Date     C/SECTION, LOW TRANSVERSE      , Low Transverse      SECTION  9/10/2013    Procedure:  SECTION;;  Surgeon: Isac Palmer MD;  Location: [facility]     FASCIOTOMY LOWER EXTREMITY Right 2016    Procedure: FASCIOTOMY LOWER EXTREMITY;  Surgeon: Joshua Arrieta DPM;  Location: [facility]     LAPAROSCOPIC CHOLECYSTECTOMY  2012    Procedure: LAPAROSCOPIC CHOLECYSTECTOMY;  Laparoscopic Cholecystectomy;  Surgeon: Kvng Borden MD;  Location: [facility]     LAPAROTOMY EXPLORATORY  2012    Procedure: LAPAROTOMY EXPLORATORY;;  Surgeon: Kvng Borden MD;  Location: [facility]     LASER HOLMIUM LITHOTRIPSY URETER(S), INSERT STENT, COMBINED Right  4/25/2019    Procedure: CYSTOURETEROSCOPY, WITH HOLMIUM LASER LITHOTRIPSY OF URETERAL CALCULUS AND STENT INSERTION;  Surgeon: Cricket Rainey MD;  Location: [facility]     RELEASE PLANTAR FASCIA Left 9/20/2016    Procedure: RELEASE PLANTAR FASCIA;  Surgeon: Joshua Arrieta DPM;  Location: [facility]     SURGICAL HISTORY OF -   8/19/2004    Right Knee Arthroscopy & Partial Medial and lateral Meniscectomies & Allograft reconstruction of the anterior cruciate ligament tear     SURGICAL HISTORY OF -   11/8/2000    Cystoscopy, right retrograde study     SURGICAL HISTORY OF -   2000    Kidney Stone Surgery     SURGICAL HISTORY OF -   7/2000    Obstructed hemivagina with hematometra and hematocolpos & Complete uterovaginal duplication. & Excision of Left vaginal septum and drainage of hematocolpos     SURGICAL HISTORY OF -       IVP with tomograms that showed absent of left kidney,      SURGICAL HISTORY OF -       Septoplasty        Family History   Problem Relation Age of Onset     Hypertension Mother      Alcohol/Drug Father         recovered alcohol     Cardiovascular Paternal Grandfather 30        MI     Cardiovascular Paternal Grandmother 60        MI     Cardiovascular Maternal Grandfather 60        MI       Social History     Socioeconomic History     Marital status: Single     Spouse name: Caleb     Number of children: 4     Years of education: Not on file     Highest education level: Not on file   Occupational History     Employer: UNEMPLOYED     Employer: [employer]   Social Needs     Financial resource strain: Not on file     Food insecurity     Worry: Not on file     Inability: Not on file     Transportation needs     Medical: Not on file     Non-medical: Not on file   Tobacco Use     Smoking status: Never Smoker     Smokeless tobacco: Never Used   Substance and Sexual Activity     Alcohol use: Yes     Comment: occas-      Drug use: No     Sexual activity: Not Currently     Partners: Male   Lifestyle      Physical activity     Days per week: Not on file     Minutes per session: Not on file     Stress: Not on file   Relationships     Social connections     Talks on phone: Not on file     Gets together: Not on file     Attends Oriental orthodox service: Not on file     Active member of club or organization: Not on file     Attends meetings of clubs or organizations: Not on file     Relationship status: Not on file     Intimate partner violence     Fear of current or ex partner: Not on file     Emotionally abused: Not on file     Physically abused: Not on file     Forced sexual activity: Not on file   Other Topics Concern     Parent/sibling w/ CABG, MI or angioplasty before 65F 55M? No   Social History Narrative     Not on file       Outpatient Encounter Medications as of 6/2/2021   Medication Sig Dispense Refill     Acetaminophen-Caffeine (EXCEDRIN TENSION HEADACHE PO) Take by mouth daily as needed       ibuprofen 200 MG capsule 4 tablets  capsule      Facility-Administered Encounter Medications as of 6/2/2021   Medication Dose Route Frequency Provider Last Rate Last Admin     0.9 % sodium chloride IV solution  1,000 mL Intravenous Continuous Kvng Borden MD         naloxone (NARCAN) injection 0.1-0.4 mg  0.1-0.4 mg Intravenous Q2 Min PRN Kvng Borden MD         sodium chloride (PF) 0.9% PF flush 3 mL  3 mL Intravenous Q1H PRN Kvng Borden MD         sodium chloride (PF) 0.9% PF flush 3 mL  3 mL Intravenous Q8H Kvng Borden MD   3 mL at 07/30/12 0639             O:   NAD, WDWN, Alert & Oriented, Mood & Affect wnl, Vitals stable   Here today alone   BP (!) 135/92   Pulse 105   SpO2 97%    General appearance normal   Vitals stable   Alert, oriented and in no acute distress      Following lymph nodes palpated: Occipital, Cervical, Supraclavicular no lad   L hip stuck on papule   Yellow papules on face   Inflammatory papules on jawline and chin  Flesh colored papules on face  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    Rare pigmented  macules <1mm on trunk and legs with regular borders and pigment networks  Stuck on papules and brown macules on trunk and ext   Red papules on trunk  Flesh colored papules on trunk     The remainder of the full exam was normal; the following areas were examined:  conjunctiva/lids, oral mucosa, neck, peripheral vascular system, abdomen, lymph nodes, digits/nails, eccrine and apocrine glands, scalp/hair, face, neck, chest, abdomen, buttocks, back, RUE, LUE, RLE, LLE       Eyes: Conjunctivae/lids:Normal     ENT: Lips, buccal mucosa, tongue: normal    MSK:Normal    Cardiovascular: peripheral edema none    Pulm: Breathing Normal    Lymph Nodes: No Head and Neck Lymphadenopathy     Neuro/Psych: Orientation:Alert and Orientedx3 ; Mood/Affect:normal       A/P:  1. Seborrheic keratosis, lentigo, angioma, dermal nevus, nevi , sebaceous hyperplasia   Cosmetic lasers discussed with patient   2. Adult female acne  Pathophysiology discussed with pateint   Acne vulgaris    Pathophysiology discussed with pateint and information provided   I discussed with patient Oral Abx, Aldactone, Topical creams, light therapies and OCT  Treating acne is preventative    Acne can be effectively treated, although response may sometimes be slow.   Where possible, avoid excessively humid conditions such as a sauna, working in an unventilated kitchen or tropical vacations.   If you smoke, stop. Nicotine increases sebum retention and increased scale within the follicles, forming comedones (black and whiteheads).    Minimize the application of oils and cosmetics to the affected skin.   Abrasive skin treatments can aggravate acne.   Try not to scratch or pick the spots.   To avoid sunburn, protect your skin outdoors using a sunscreen and protective clothing.  No relationship between particular foods and acne has been proven. However, reports suggest low glycemic and low dairy diet are helpful for some people.    May take 3-4 months to see 50%  improvement  May get worse during initial phase of treatment  Tretinoin at bedtime, dryness, irritation and way to prevent discussed with patient   BPO wash daily or every other day depending on dryness  Aggressive use of bland emollients discussed with patient   Aldactone daily GI upset, esophagitis and UV precautions discussed with patient     It was a pleasure speaking to Capri Albert today.  Previous clinic notes and pertinent laboratory tests were reviewed prior to Capri Albert's visit.  BENIGN LESIONS DISCUSSED WITH PATIENT:  I discussed the specifics of tumor, prognosis, and genetics of benign lesions.  I explained that treatment of these lesions would be purely cosmetic and not medically neccessary.  I discussed with patient different removal options including excision, cautery and /or laser.      Nature of benign skin lesions dicussed with patient.  Signs and Symptoms of skin cancer discussed with patient.  Patient encouraged to perform monthly skin exams.  UV precautions reviewed with patient.  Capri to follow up with Primary Care provider regarding elevated blood pressure.  Return to clinic 3 months

## 2021-06-02 NOTE — PATIENT INSTRUCTIONS
Start using over the counter benzoyl peroxide wash in the morning  -RX cream at bedtime  -RX pill daily  -Return to clinic in 3 months for follow up

## 2021-06-03 RX ORDER — ACYCLOVIR 50 MG/G
OINTMENT TOPICAL
Qty: 15 G | Refills: 3 | Status: SHIPPED | OUTPATIENT
Start: 2021-06-03

## 2021-09-18 ENCOUNTER — HEALTH MAINTENANCE LETTER (OUTPATIENT)
Age: 36
End: 2021-09-18

## 2022-01-02 ENCOUNTER — HEALTH MAINTENANCE LETTER (OUTPATIENT)
Age: 37
End: 2022-01-02

## 2022-11-19 ENCOUNTER — HEALTH MAINTENANCE LETTER (OUTPATIENT)
Age: 37
End: 2022-11-19

## 2023-04-09 ENCOUNTER — HEALTH MAINTENANCE LETTER (OUTPATIENT)
Age: 38
End: 2023-04-09

## 2024-04-07 ENCOUNTER — HEALTH MAINTENANCE LETTER (OUTPATIENT)
Age: 39
End: 2024-04-07

## 2024-10-08 NOTE — ED NOTES
Right abd pain x 1 week. Pain comes and goes. Feels like burning inside. Has a hx of kidney stones but this feels different. Only has one kidney right side. Denies n/v/d. Last bm today and it was normal. Has had her gall bladder out. Denies fever.  
States understanding of discharge instructions.  
no

## (undated) DEVICE — SPECIMEN CONTAINER 5OZ STERILE 2600SA

## (undated) DEVICE — SOL NACL 0.9% IRRIG 1000ML BOTTLE 2F7124

## (undated) DEVICE — SUCTION MANIFOLD DORNOCH ULTRA CART UL-CL500

## (undated) DEVICE — JELLY LUBRICATING SURGILUBE 2OZ TUBE 0281-0205-02

## (undated) DEVICE — STENT URETERAL PERCUFLEX PLUS 7FRX24CM M0061752720
Type: IMPLANTABLE DEVICE | Site: URETER | Status: NON-FUNCTIONAL
Removed: 2019-04-25

## (undated) DEVICE — GUIDEWIRE AMPLATZ SUPER STIFF .035 X 145CM M0066401080

## (undated) DEVICE — STRAP KNEE/BODY 31143004

## (undated) DEVICE — BASKET STONE EXTRACTOR NITINOL NCIRCLE 1.5FRX115CM G46206

## (undated) DEVICE — SOL WATER IRRIG 1000ML BOTTLE 2F7114

## (undated) DEVICE — DRAPE C-ARM W/STRAPS 42X72" 07-CA104

## (undated) DEVICE — CATH URETERAL OPEN END 5FRX70CM M0064002010

## (undated) DEVICE — PAD CHUX UNDERPAD 30X36" P3036C

## (undated) DEVICE — CATH URETERAL DUAL LUMEN 10FRX54CM M0064051000

## (undated) DEVICE — LINEN GOWN X4 5410

## (undated) DEVICE — GUIDEWIRE SENSOR DUAL FLEX STR 0.035"X150CM M0066703080

## (undated) DEVICE — SYR 50ML LL W/O NDL 309653

## (undated) DEVICE — SOL NACL 0.9% IRRIG 3000ML BAG 2B7477

## (undated) DEVICE — ADAPTER SCOPE UROLOK II LF M0067301400

## (undated) DEVICE — GOWN IMPERVIOUS SPECIALTY XLG/XLONG 32474

## (undated) DEVICE — GLOVE PROTEXIS W/NEU-THERA 7.5  2D73TE75

## (undated) DEVICE — Device

## (undated) DEVICE — LASER FIBER HOLMIUM FLEXIVA 200UM M0068403910 840-391

## (undated) DEVICE — SYR 30ML LL W/O NDL 302832

## (undated) DEVICE — LINEN TOWEL PACK X5 5464

## (undated) RX ORDER — PROPOFOL 10 MG/ML
INJECTION, EMULSION INTRAVENOUS
Status: DISPENSED
Start: 2019-04-25

## (undated) RX ORDER — FENTANYL CITRATE 50 UG/ML
INJECTION, SOLUTION INTRAMUSCULAR; INTRAVENOUS
Status: DISPENSED
Start: 2019-04-25

## (undated) RX ORDER — EPHEDRINE SULFATE 50 MG/ML
INJECTION, SOLUTION INTRAMUSCULAR; INTRAVENOUS; SUBCUTANEOUS
Status: DISPENSED
Start: 2019-04-25

## (undated) RX ORDER — OXYCODONE HYDROCHLORIDE 5 MG/1
TABLET ORAL
Status: DISPENSED
Start: 2019-04-25

## (undated) RX ORDER — ACETAMINOPHEN 325 MG/1
TABLET ORAL
Status: DISPENSED
Start: 2019-04-25

## (undated) RX ORDER — ONDANSETRON 2 MG/ML
INJECTION INTRAMUSCULAR; INTRAVENOUS
Status: DISPENSED
Start: 2019-04-25